# Patient Record
Sex: MALE | Employment: OTHER | URBAN - METROPOLITAN AREA
[De-identification: names, ages, dates, MRNs, and addresses within clinical notes are randomized per-mention and may not be internally consistent; named-entity substitution may affect disease eponyms.]

---

## 2024-02-03 ENCOUNTER — HOSPITAL ENCOUNTER (EMERGENCY)
Facility: HOSPITAL | Age: 69
Discharge: HOME | End: 2024-02-03
Attending: EMERGENCY MEDICINE
Payer: MEDICARE

## 2024-02-03 ENCOUNTER — HOSPITAL ENCOUNTER (EMERGENCY)
Facility: HOSPITAL | Age: 69
Discharge: STILL ADMITTED OR ADMINISTRATIVE | End: 2024-02-03
Attending: EMERGENCY MEDICINE
Payer: MEDICARE

## 2024-02-03 ENCOUNTER — APPOINTMENT (OUTPATIENT)
Dept: RADIOLOGY | Facility: HOSPITAL | Age: 69
End: 2024-02-03
Payer: MEDICARE

## 2024-02-03 VITALS
SYSTOLIC BLOOD PRESSURE: 92 MMHG | TEMPERATURE: 97.9 F | DIASTOLIC BLOOD PRESSURE: 65 MMHG | HEART RATE: 69 BPM | RESPIRATION RATE: 18 BRPM | OXYGEN SATURATION: 97 %

## 2024-02-03 DIAGNOSIS — T83.010A SUPRAPUBIC CATHETER DYSFUNCTION, INITIAL ENCOUNTER (CMS-HCC): ICD-10-CM

## 2024-02-03 DIAGNOSIS — E86.0 DEHYDRATION: Primary | ICD-10-CM

## 2024-02-03 DIAGNOSIS — M54.2 NECK PAIN: ICD-10-CM

## 2024-02-03 LAB
ALBUMIN SERPL BCP-MCNC: 3.1 G/DL (ref 3.4–5)
ALP SERPL-CCNC: 99 U/L (ref 33–136)
ALT SERPL W P-5'-P-CCNC: 25 U/L (ref 10–52)
ANION GAP SERPL CALC-SCNC: 15 MMOL/L (ref 10–20)
AST SERPL W P-5'-P-CCNC: 18 U/L (ref 9–39)
BASOPHILS # BLD AUTO: 0.02 X10*3/UL (ref 0–0.1)
BASOPHILS NFR BLD AUTO: 0.2 %
BILIRUB SERPL-MCNC: 0.5 MG/DL (ref 0–1.2)
BUN SERPL-MCNC: 26 MG/DL (ref 6–23)
CALCIUM SERPL-MCNC: 9.3 MG/DL (ref 8.6–10.3)
CHLORIDE SERPL-SCNC: 94 MMOL/L (ref 98–107)
CO2 SERPL-SCNC: 24 MMOL/L (ref 21–32)
CREAT SERPL-MCNC: 0.37 MG/DL (ref 0.5–1.3)
EGFRCR SERPLBLD CKD-EPI 2021: >90 ML/MIN/1.73M*2
EOSINOPHIL # BLD AUTO: 0.02 X10*3/UL (ref 0–0.7)
EOSINOPHIL NFR BLD AUTO: 0.2 %
ERYTHROCYTE [DISTWIDTH] IN BLOOD BY AUTOMATED COUNT: 17.2 % (ref 11.5–14.5)
GLUCOSE SERPL-MCNC: 119 MG/DL (ref 74–99)
HCT VFR BLD AUTO: 40.1 % (ref 41–52)
HGB BLD-MCNC: 12.5 G/DL (ref 13.5–17.5)
IMM GRANULOCYTES # BLD AUTO: 0.03 X10*3/UL (ref 0–0.7)
IMM GRANULOCYTES NFR BLD AUTO: 0.3 % (ref 0–0.9)
LIPASE SERPL-CCNC: 7 U/L (ref 9–82)
LYMPHOCYTES # BLD AUTO: 0.35 X10*3/UL (ref 1.2–4.8)
LYMPHOCYTES NFR BLD AUTO: 3.4 %
MCH RBC QN AUTO: 26.4 PG (ref 26–34)
MCHC RBC AUTO-ENTMCNC: 31.2 G/DL (ref 32–36)
MCV RBC AUTO: 85 FL (ref 80–100)
MONOCYTES # BLD AUTO: 0.65 X10*3/UL (ref 0.1–1)
MONOCYTES NFR BLD AUTO: 6.2 %
NEUTROPHILS # BLD AUTO: 9.36 X10*3/UL (ref 1.2–7.7)
NEUTROPHILS NFR BLD AUTO: 89.7 %
NRBC BLD-RTO: 0 /100 WBCS (ref 0–0)
PLATELET # BLD AUTO: 209 X10*3/UL (ref 150–450)
POTASSIUM SERPL-SCNC: 3.8 MMOL/L (ref 3.5–5.3)
PROT SERPL-MCNC: 7.5 G/DL (ref 6.4–8.2)
RBC # BLD AUTO: 4.73 X10*6/UL (ref 4.5–5.9)
SODIUM SERPL-SCNC: 129 MMOL/L (ref 136–145)
WBC # BLD AUTO: 10.4 X10*3/UL (ref 4.4–11.3)

## 2024-02-03 PROCEDURE — 83690 ASSAY OF LIPASE: CPT | Performed by: EMERGENCY MEDICINE

## 2024-02-03 PROCEDURE — 2500000004 HC RX 250 GENERAL PHARMACY W/ HCPCS (ALT 636 FOR OP/ED): Performed by: EMERGENCY MEDICINE

## 2024-02-03 PROCEDURE — 99283 EMERGENCY DEPT VISIT LOW MDM: CPT | Performed by: EMERGENCY MEDICINE

## 2024-02-03 PROCEDURE — 36415 COLL VENOUS BLD VENIPUNCTURE: CPT | Performed by: EMERGENCY MEDICINE

## 2024-02-03 PROCEDURE — 87086 URINE CULTURE/COLONY COUNT: CPT | Mod: AHULAB | Performed by: EMERGENCY MEDICINE

## 2024-02-03 PROCEDURE — 4500999001 HC ED NO CHARGE

## 2024-02-03 PROCEDURE — 72125 CT NECK SPINE W/O DYE: CPT

## 2024-02-03 PROCEDURE — 84075 ASSAY ALKALINE PHOSPHATASE: CPT | Performed by: EMERGENCY MEDICINE

## 2024-02-03 PROCEDURE — 51798 US URINE CAPACITY MEASURE: CPT

## 2024-02-03 PROCEDURE — 51702 INSERT TEMP BLADDER CATH: CPT | Performed by: EMERGENCY MEDICINE

## 2024-02-03 PROCEDURE — 85025 COMPLETE CBC W/AUTO DIFF WBC: CPT | Performed by: EMERGENCY MEDICINE

## 2024-02-03 PROCEDURE — 72125 CT NECK SPINE W/O DYE: CPT | Performed by: RADIOLOGY

## 2024-02-03 PROCEDURE — 99284 EMERGENCY DEPT VISIT MOD MDM: CPT | Mod: 25 | Performed by: EMERGENCY MEDICINE

## 2024-02-03 RX ORDER — CYCLOBENZAPRINE HCL 10 MG
10 TABLET ORAL 3 TIMES DAILY PRN
Qty: 21 TABLET | Refills: 0 | Status: SHIPPED | OUTPATIENT
Start: 2024-02-03 | End: 2024-05-11 | Stop reason: HOSPADM

## 2024-02-03 RX ADMIN — SODIUM CHLORIDE 1000 ML: 9 INJECTION, SOLUTION INTRAVENOUS at 02:55

## 2024-02-03 NOTE — ED PROVIDER NOTES
HPI   No chief complaint on file.        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  He has complaints today of suprapubic catheter dysfunction.  Suprapubic catheter was placed on 9 January.  He apparently had stopped flowing today and he felt full.  The wife attempted to flush it and stated that there was a balloon that blew up.  Patient also notes 3 days of increased neck pain.  Denies any new falls.  No new fever.  He does have some sensory dysfunction below his chest.  Does have a trach.  He answers yes/no questions.  Can mild out certain words but difficult understand otherwise.  History is also obtained from patient's wife.                  [place] Coma Scale Score: 15                  Patient History   No past medical history on file.  No past surgical history on file.  No family history on file.  Social History     Tobacco Use    Smoking status: Not on file    Smokeless tobacco: Not on file   Substance Use Topics    Alcohol use: Not on file    Drug use: Not on file       Physical Exam   ED Triage Vitals   Temp Pulse Resp BP   -- -- -- --      SpO2 Temp src Heart Rate Source Patient Position   -- -- -- --      BP Location FiO2 (%)     -- --       Physical Exam  Vitals and nursing note reviewed.   Constitutional:       General: He is not in acute distress.     Appearance: He is well-developed.   HENT:      Head: Normocephalic and atraumatic.   Eyes:      Conjunctiva/sclera: Conjunctivae normal.   Cardiovascular:      Rate and Rhythm: Normal rate and regular rhythm.      Heart sounds: No murmur heard.  Pulmonary:      Effort: Pulmonary effort is normal. No respiratory distress.      Breath sounds: Normal breath sounds.   Abdominal:      Palpations: Abdomen is soft.      Tenderness: There is no abdominal tenderness.      Comments: Suprapubic catheter site clean dry intact   Musculoskeletal:         General: Deformity present. No swelling.      Cervical back:  Neck supple.      Right lower leg: Edema present.      Left lower leg: Edema present.      Comments: Chronic contractures of lower extremities, trace edema symmetric bilaterally   Skin:     General: Skin is warm and dry.      Capillary Refill: Capillary refill takes less than 2 seconds.   Neurological:      Mental Status: He is alert.   Psychiatric:         Mood and Affect: Mood normal.         ED Course & MDM   Diagnoses as of 02/03/24 0327   Dehydration   Suprapubic catheter dysfunction, initial encounter (CMS/[facility])   Neck pain       Medical Decision Making  I replaced a coudé catheter in the suprapubic catheter.  And was concerned when there was no return of urine.  We BladderScan and seems to be there is less than 40 cc of urine.  Patient does have dark urine in his bag recently may have been dehydrated and not be producing urine.  Will assess kidney function today.  Will also assess a CT of the neck.    Procedure  Bladder Catheterization    Performed by: Hossein Godfrey MD  Authorized by: Hossein Godfrey MD    Consent:     Consent obtained:  Verbal    Consent given by:  Patient and spouse  Universal protocol:     Procedure explained and questions answered to patient or proxy's satisfaction: yes      Patient identity confirmed:  Verbally with patient  Pre-procedure details:     Procedure purpose:  Therapeutic  Anesthesia:     Anesthesia method:  None  Procedure details:     Provider performed due to:  Altered anatomy       Hossein Godfrey MD  02/03/24 0019

## 2024-02-04 LAB — BACTERIA UR CULT: ABNORMAL

## 2024-02-05 ENCOUNTER — TELEPHONE (OUTPATIENT)
Dept: PHARMACY | Facility: HOSPITAL | Age: 69
End: 2024-02-05
Payer: MEDICARE

## 2024-02-05 NOTE — PROGRESS NOTES
EDPD Note: Rapid Result Review    Reviewed Mr. Wai Rodrigues III 's chart regarding a inconclusive urine culture that was taken during their recent emergency room visit. The patient was not told about these results prior to leaving the emergency department. Therefore, patient was contacted and given proper education.     Patient presented to the ED with complaints of catheter dysfunction. Patient was discharged without any antibiotics. Spoke with patient's wife Lotus today who denied that the patient was having any urinary symptoms. Therefore, given the inconclusive result and no urinary symptoms no antibiotics are appropriate at this time.     Urine Culture Multiple organisms present, probable contamination. Repeat culture if clinically indicated. Abnormal           No further follow up needed from EDPD Team.     Halima Mendes, PharmD

## 2024-03-21 ENCOUNTER — HOSPITAL ENCOUNTER (OUTPATIENT)
Dept: RADIOLOGY | Facility: CLINIC | Age: 69
Discharge: HOME | End: 2024-03-21
Payer: MEDICARE

## 2024-03-21 ENCOUNTER — OFFICE VISIT (OUTPATIENT)
Dept: OTOLARYNGOLOGY | Facility: CLINIC | Age: 69
End: 2024-03-21
Payer: MEDICARE

## 2024-03-21 DIAGNOSIS — J39.8 TRACHEAL STENOSIS: ICD-10-CM

## 2024-03-21 DIAGNOSIS — Z01.818 PRE-OPERATIVE CLEARANCE: ICD-10-CM

## 2024-03-21 DIAGNOSIS — Z01.818 PRE-OPERATIVE CLEARANCE: Primary | ICD-10-CM

## 2024-03-21 DIAGNOSIS — Z43.0 ATTENTION TO TRACHEOSTOMY (MULTI): ICD-10-CM

## 2024-03-21 PROCEDURE — 31615 TRCHEOBRNCHSC EST TRACHS INC: CPT | Performed by: OTOLARYNGOLOGY

## 2024-03-21 PROCEDURE — 31579 LARYNGOSCOPY TELESCOPIC: CPT | Performed by: OTOLARYNGOLOGY

## 2024-03-21 PROCEDURE — 99214 OFFICE O/P EST MOD 30 MIN: CPT | Performed by: OTOLARYNGOLOGY

## 2024-03-21 PROCEDURE — 1159F MED LIST DOCD IN RCRD: CPT | Performed by: OTOLARYNGOLOGY

## 2024-03-21 PROCEDURE — 71045 X-RAY EXAM CHEST 1 VIEW: CPT

## 2024-03-21 RX ORDER — ATORVASTATIN CALCIUM 80 MG/1
80 TABLET, FILM COATED ORAL
COMMUNITY
Start: 2023-06-07 | End: 2025-02-26

## 2024-03-21 RX ORDER — CLONAZEPAM 1 MG/1
1 TABLET ORAL 2 TIMES DAILY
COMMUNITY
Start: 2023-06-07

## 2024-03-21 RX ORDER — NAPROXEN SODIUM 220 MG/1
81 TABLET, FILM COATED ORAL
COMMUNITY
Start: 2023-06-07

## 2024-03-21 RX ORDER — ACETAMINOPHEN 160 MG/5ML
650 SOLUTION ORAL EVERY 6 HOURS PRN
COMMUNITY
Start: 2023-04-21

## 2024-03-21 RX ORDER — ACETYLCYSTEINE 100 MG/ML
6 SOLUTION ORAL; RESPIRATORY (INHALATION) 3 TIMES DAILY
COMMUNITY
Start: 2023-12-07 | End: 2024-12-06

## 2024-03-21 RX ORDER — GLYCERIN/MALTODEXTRIN
30 LIQUID (ML) ORAL 3 TIMES DAILY
COMMUNITY
Start: 2023-10-19

## 2024-03-21 RX ORDER — PEDI MULTIVIT 158/IRON/VIT K1 18MG-10MCG
1 TABLET,CHEWABLE ORAL
COMMUNITY

## 2024-03-21 RX ORDER — ACETAMINOPHEN 325 MG/1
2 TABLET ORAL EVERY 4 HOURS PRN
COMMUNITY
Start: 2023-07-31

## 2024-03-21 ASSESSMENT — PATIENT HEALTH QUESTIONNAIRE - PHQ9
1. LITTLE INTEREST OR PLEASURE IN DOING THINGS: NOT AT ALL
2. FEELING DOWN, DEPRESSED OR HOPELESS: NOT AT ALL
SUM OF ALL RESPONSES TO PHQ9 QUESTIONS 1 AND 2: 0

## 2024-03-21 NOTE — PROGRESS NOTES
ASSESSMENT AND PLAN:   Wai Rodrigues III is a 68 y.o. male with a history of quadriplegia after a fall. He was intubated for 5 weeks before trach. He was capped for a short period of time, but did not tolerate this.      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 No stenosis and I anticipate a stomoplasty and balloon dilation/CO2 will improve airway diameter. He will be scheduled for this at a time that is convenient for him. He would like this done at [facility] as they live in [place], [place].       Reason For Consult  No chief complaint on file.       HISTORY OF PRESENT ILLNESS:  Wai Rodrigues III is a 68 y.o. male presenting for a visit with me for possible SGS.  He is a quadriplegic after a fall, suprapubic catheter. Goes to [facility]. Dissatisfied with breathing treatments and was referred for possible decannulation.     He is accompanied by his wife who also serves as his historian. The patient reports that the trach has been present since 04/2023. He had a fall while getting back in bed and was hospitalized. He was intubated for 5 weeks and subsequently trached. He is tolerating his PMV. He was seen by Dr. Batista who noted scar tissue that would prevent decanulation. His voice and breathing are improving after his most recent trach change from a cuffed trach.     Prior History:   Previously seen by Dr. Batista     NPV - no ENT notes    Past Medical History  He has no past medical history on file. Surgical History  He has no past surgical history on file.   Social History  He has no history on file for tobacco use, alcohol use, and drug use. Allergies  Patient has no allergy information on record.     Family History  No family history on file.    Review of Systems  All 10 systems were reviewed and negative except for above.      Last Recorded  Vitals  There were no vitals taken for this visit.    Physical Exam  ENT Physical Exam  Constitutional  Appearance: patient appears well-developed and well-nourished,  Head and Face  Appearance: head appears normal and face appears normal;  Ear  Auricles: right auricle normal; left auricle normal;  Nose  External Nose: nares patent bilaterally;  Oral Cavity/Oropharynx  Lips: normal;  Neck  Neck: neck palpation normal; tracheostomy noted;  Respiratory  Inspection: no retractions visible;  Cardiovascular  Inspection: no peripheral edema present;  Neurovestibular  Mental Status: alert and oriented;  Psychiatric: mood normal;  Cranial Nerves: cranial nerves intact;          Procedures     Flexible Laryngoscopy w/ Videostroboscopy    VOICE AND SPEECH CHARACTERISTICS:  Normal spoken speech, (+) moderate dysphonia, no roughness, (+) moderate breathiness, (+) moderate asthenia, (+) mild strain.    Intelligibility: reduced.   Resonance: balanced.   Vocal Loudness: reduced.   Breath Support: decreased.    PROCEDURE:    Indications:  Trach care  PROCEDURE NOTE: BRONCHOSCOPY VIA TRACHEOSTOMY   I recommended bronchoscopy via tracheostomy based on PE findings, and/or concern for pathology based upon history of airway complaints. Risks, benefits, and alternatives were explained. The patient wishes to proceed and gives verbal consent.  Patient is seated in the exam chair. After adequate topical anesthesia, I advance the flexible endoscope. The examination included evaluation of the calderon, vallecula, base of tongue, pyriforms, post-cricoid area, larynx and immediate subglottis.  Findings : assessment of the nasopharynx, base of tongue/vallecula, pyriform sinuses, post-cricoid area and pharyngeal walls was without lesion or mass, pharyngeal wall contraction is normal and symmetric, and no pooling of secretions  ventricular obliteration: 2 (present)  Gross Arytenoid Movement: symmetric.  Arytenoid Height: normal.   Supraglottic  Tension: lateral and ant/post.  Symmetry: normal.   Amplitude: normal.  Phase Closure: in-phase.  Mucosal Wave Lateral Excursion/Secondary Wave: Bilateral Vocal Cord: no restriction - wave moved more than ½ the width of the vocal fold.  Periodicity: normal.  Closure: bowing.  Additional Findings: Widely patent to the antwon. Suprastomal tissue collapse posteriorly. I was unable to evaluate the suprastomal trachea.       Time Spent  Prep time on day of patient encounter: 10 minutes  Time spent directly with patient, family or caregiver: 15 minutes  Additional Time Spent on Patient Care Activities/Discussion with SLP re care plan: 5 minutes  Documentation Time: 10 minutes  Other Time Spent: 0 minutes  Total: 40 minutes       Scribe Attestation  By signing my name below, I, Edna Keith , Scribedilia attest that this documentation has been prepared under the direction and in the presence of Rikki Hardin MD.

## 2024-05-09 ENCOUNTER — ANESTHESIA EVENT (OUTPATIENT)
Dept: OPERATING ROOM | Facility: HOSPITAL | Age: 69
DRG: 166 | End: 2024-05-09
Payer: MEDICARE

## 2024-05-10 ENCOUNTER — APPOINTMENT (OUTPATIENT)
Dept: RADIOLOGY | Facility: HOSPITAL | Age: 69
DRG: 166 | End: 2024-05-10
Payer: MEDICARE

## 2024-05-10 ENCOUNTER — HOSPITAL ENCOUNTER (INPATIENT)
Facility: HOSPITAL | Age: 69
LOS: 1 days | Discharge: HOME | DRG: 166 | End: 2024-05-11
Attending: OTOLARYNGOLOGY | Admitting: OTOLARYNGOLOGY
Payer: MEDICARE

## 2024-05-10 ENCOUNTER — ANESTHESIA (OUTPATIENT)
Dept: OPERATING ROOM | Facility: HOSPITAL | Age: 69
DRG: 166 | End: 2024-05-10
Payer: MEDICARE

## 2024-05-10 DIAGNOSIS — J39.8 TRACHEAL STENOSIS: Primary | ICD-10-CM

## 2024-05-10 DIAGNOSIS — Z43.0 ATTENTION TO TRACHEOSTOMY (MULTI): ICD-10-CM

## 2024-05-10 PROBLEM — I25.10 CAD (CORONARY ARTERY DISEASE): Status: ACTIVE | Noted: 2024-05-10

## 2024-05-10 PROBLEM — I10 HTN (HYPERTENSION): Status: ACTIVE | Noted: 2024-05-10

## 2024-05-10 PROCEDURE — 7100000001 HC RECOVERY ROOM TIME - INITIAL BASE CHARGE: Performed by: OTOLARYNGOLOGY

## 2024-05-10 PROCEDURE — 2500000001 HC RX 250 WO HCPCS SELF ADMINISTERED DRUGS (ALT 637 FOR MEDICARE OP): Performed by: PHARMACIST

## 2024-05-10 PROCEDURE — 1100000001 HC PRIVATE ROOM DAILY

## 2024-05-10 PROCEDURE — A4217 STERILE WATER/SALINE, 500 ML: HCPCS | Performed by: OTOLARYNGOLOGY

## 2024-05-10 PROCEDURE — 71045 X-RAY EXAM CHEST 1 VIEW: CPT | Performed by: RADIOLOGY

## 2024-05-10 PROCEDURE — 2500000004 HC RX 250 GENERAL PHARMACY W/ HCPCS (ALT 636 FOR OP/ED): Performed by: ANESTHESIOLOGIST ASSISTANT

## 2024-05-10 PROCEDURE — 2720000007 HC OR 272 NO HCPCS: Performed by: OTOLARYNGOLOGY

## 2024-05-10 PROCEDURE — 94640 AIRWAY INHALATION TREATMENT: CPT

## 2024-05-10 PROCEDURE — 0B718ZZ DILATION OF TRACHEA, VIA NATURAL OR ARTIFICIAL OPENING ENDOSCOPIC: ICD-10-PCS | Performed by: OTOLARYNGOLOGY

## 2024-05-10 PROCEDURE — 0CJS8ZZ INSPECTION OF LARYNX, VIA NATURAL OR ARTIFICIAL OPENING ENDOSCOPIC: ICD-10-PCS | Performed by: OTOLARYNGOLOGY

## 2024-05-10 PROCEDURE — 2500000005 HC RX 250 GENERAL PHARMACY W/O HCPCS: Performed by: ANESTHESIOLOGIST ASSISTANT

## 2024-05-10 PROCEDURE — 31630 BRONCHOSCOPY DILATE/FX REPR: CPT | Performed by: OTOLARYNGOLOGY

## 2024-05-10 PROCEDURE — 2500000001 HC RX 250 WO HCPCS SELF ADMINISTERED DRUGS (ALT 637 FOR MEDICARE OP): Performed by: OTOLARYNGOLOGY

## 2024-05-10 PROCEDURE — 3600000007 HC OR TIME - EACH INCREMENTAL 1 MINUTE - PROCEDURE LEVEL TWO: Performed by: OTOLARYNGOLOGY

## 2024-05-10 PROCEDURE — 7100000002 HC RECOVERY ROOM TIME - EACH INCREMENTAL 1 MINUTE: Performed by: OTOLARYNGOLOGY

## 2024-05-10 PROCEDURE — C1726 CATH, BAL DIL, NON-VASCULAR: HCPCS | Performed by: OTOLARYNGOLOGY

## 2024-05-10 PROCEDURE — 3700000001 HC GENERAL ANESTHESIA TIME - INITIAL BASE CHARGE: Performed by: OTOLARYNGOLOGY

## 2024-05-10 PROCEDURE — 99238 HOSP IP/OBS DSCHRG MGMT 30/<: CPT | Performed by: OTOLARYNGOLOGY

## 2024-05-10 PROCEDURE — 2500000005 HC RX 250 GENERAL PHARMACY W/O HCPCS: Performed by: OTOLARYNGOLOGY

## 2024-05-10 PROCEDURE — 2500000004 HC RX 250 GENERAL PHARMACY W/ HCPCS (ALT 636 FOR OP/ED): Performed by: STUDENT IN AN ORGANIZED HEALTH CARE EDUCATION/TRAINING PROGRAM

## 2024-05-10 PROCEDURE — 96372 THER/PROPH/DIAG INJ SC/IM: CPT | Performed by: OTOLARYNGOLOGY

## 2024-05-10 PROCEDURE — 2500000005 HC RX 250 GENERAL PHARMACY W/O HCPCS: Performed by: ANESTHESIOLOGY

## 2024-05-10 PROCEDURE — A31526 PR LARYNGOSCOPY,DIRECT,DX,OP MICROSCOP: Performed by: STUDENT IN AN ORGANIZED HEALTH CARE EDUCATION/TRAINING PROGRAM

## 2024-05-10 PROCEDURE — 2500000004 HC RX 250 GENERAL PHARMACY W/ HCPCS (ALT 636 FOR OP/ED): Performed by: ANESTHESIOLOGY

## 2024-05-10 PROCEDURE — A31526 PR LARYNGOSCOPY,DIRECT,DX,OP MICROSCOP: Performed by: ANESTHESIOLOGIST ASSISTANT

## 2024-05-10 PROCEDURE — 31571 LARYNGOSCOP W/VC INJ + SCOPE: CPT | Performed by: OTOLARYNGOLOGY

## 2024-05-10 PROCEDURE — 2500000002 HC RX 250 W HCPCS SELF ADMINISTERED DRUGS (ALT 637 FOR MEDICARE OP, ALT 636 FOR OP/ED): Performed by: ANESTHESIOLOGY

## 2024-05-10 PROCEDURE — 3700000002 HC GENERAL ANESTHESIA TIME - EACH INCREMENTAL 1 MINUTE: Performed by: OTOLARYNGOLOGY

## 2024-05-10 PROCEDURE — 2500000001 HC RX 250 WO HCPCS SELF ADMINISTERED DRUGS (ALT 637 FOR MEDICARE OP): Performed by: STUDENT IN AN ORGANIZED HEALTH CARE EDUCATION/TRAINING PROGRAM

## 2024-05-10 PROCEDURE — 31613 TRACHEOSTOMA REVJ SIMPLE: CPT | Performed by: OTOLARYNGOLOGY

## 2024-05-10 PROCEDURE — 2500000004 HC RX 250 GENERAL PHARMACY W/ HCPCS (ALT 636 FOR OP/ED): Performed by: OTOLARYNGOLOGY

## 2024-05-10 PROCEDURE — 0B21XFZ CHANGE TRACHEOSTOMY DEVICE IN TRACHEA, EXTERNAL APPROACH: ICD-10-PCS | Performed by: OTOLARYNGOLOGY

## 2024-05-10 PROCEDURE — 71045 X-RAY EXAM CHEST 1 VIEW: CPT

## 2024-05-10 PROCEDURE — 3600000002 HC OR TIME - INITIAL BASE CHARGE - PROCEDURE LEVEL TWO: Performed by: OTOLARYNGOLOGY

## 2024-05-10 PROCEDURE — 2500000002 HC RX 250 W HCPCS SELF ADMINISTERED DRUGS (ALT 637 FOR MEDICARE OP, ALT 636 FOR OP/ED): Performed by: STUDENT IN AN ORGANIZED HEALTH CARE EDUCATION/TRAINING PROGRAM

## 2024-05-10 RX ORDER — OXYCODONE HYDROCHLORIDE 5 MG/1
5 TABLET ORAL EVERY 4 HOURS PRN
Status: DISCONTINUED | OUTPATIENT
Start: 2024-05-10 | End: 2024-05-10

## 2024-05-10 RX ORDER — ACETAMINOPHEN 325 MG/1
975 TABLET ORAL ONCE
Status: COMPLETED | OUTPATIENT
Start: 2024-05-10 | End: 2024-05-10

## 2024-05-10 RX ORDER — LANOLIN ALCOHOL/MO/W.PET/CERES
500 CREAM (GRAM) TOPICAL DAILY
COMMUNITY

## 2024-05-10 RX ORDER — DEXAMETHASONE SODIUM PHOSPHATE 10 MG/ML
INJECTION INTRAMUSCULAR; INTRAVENOUS AS NEEDED
Status: DISCONTINUED | OUTPATIENT
Start: 2024-05-10 | End: 2024-05-10

## 2024-05-10 RX ORDER — ROCURONIUM BROMIDE 10 MG/ML
INJECTION, SOLUTION INTRAVENOUS AS NEEDED
Status: DISCONTINUED | OUTPATIENT
Start: 2024-05-10 | End: 2024-05-10

## 2024-05-10 RX ORDER — ACETYLCYSTEINE 200 MG/ML
3 SOLUTION ORAL; RESPIRATORY (INHALATION) ONCE
Status: COMPLETED | OUTPATIENT
Start: 2024-05-10 | End: 2024-05-10

## 2024-05-10 RX ORDER — FLUOXETINE HYDROCHLORIDE 20 MG/1
20 CAPSULE ORAL DAILY
COMMUNITY

## 2024-05-10 RX ORDER — LIDOCAINE HYDROCHLORIDE 20 MG/ML
INJECTION, SOLUTION EPIDURAL; INFILTRATION; INTRACAUDAL; PERINEURAL AS NEEDED
Status: DISCONTINUED | OUTPATIENT
Start: 2024-05-10 | End: 2024-05-10

## 2024-05-10 RX ORDER — DIPHENHYDRAMINE HYDROCHLORIDE 50 MG/ML
12.5 INJECTION INTRAMUSCULAR; INTRAVENOUS ONCE AS NEEDED
Status: DISCONTINUED | OUTPATIENT
Start: 2024-05-10 | End: 2024-05-10

## 2024-05-10 RX ORDER — IPRATROPIUM BROMIDE AND ALBUTEROL SULFATE 2.5; .5 MG/3ML; MG/3ML
3 SOLUTION RESPIRATORY (INHALATION) EVERY 4 HOURS PRN
Status: DISCONTINUED | OUTPATIENT
Start: 2024-05-10 | End: 2024-05-10

## 2024-05-10 RX ORDER — SODIUM CHLORIDE 0.9 G/100ML
IRRIGANT IRRIGATION AS NEEDED
Status: DISCONTINUED | OUTPATIENT
Start: 2024-05-10 | End: 2024-05-10 | Stop reason: HOSPADM

## 2024-05-10 RX ORDER — NAPROXEN SODIUM 220 MG/1
81 TABLET, FILM COATED ORAL
Status: DISCONTINUED | OUTPATIENT
Start: 2024-05-11 | End: 2024-05-10

## 2024-05-10 RX ORDER — HYDRALAZINE HYDROCHLORIDE 20 MG/ML
5 INJECTION INTRAMUSCULAR; INTRAVENOUS EVERY 30 MIN PRN
Status: DISCONTINUED | OUTPATIENT
Start: 2024-05-10 | End: 2024-05-10

## 2024-05-10 RX ORDER — ACETYLCYSTEINE 200 MG/ML
0.25 SOLUTION ORAL; RESPIRATORY (INHALATION)
Status: DISCONTINUED | OUTPATIENT
Start: 2024-05-10 | End: 2024-05-11 | Stop reason: HOSPADM

## 2024-05-10 RX ORDER — EPINEPHRINE 1 MG/ML
INJECTION INTRAMUSCULAR; INTRAVENOUS; SUBCUTANEOUS AS NEEDED
Status: DISCONTINUED | OUTPATIENT
Start: 2024-05-10 | End: 2024-05-10 | Stop reason: HOSPADM

## 2024-05-10 RX ORDER — IPRATROPIUM BROMIDE AND ALBUTEROL SULFATE 2.5; .5 MG/3ML; MG/3ML
3 SOLUTION RESPIRATORY (INHALATION)
Status: DISCONTINUED | OUTPATIENT
Start: 2024-05-11 | End: 2024-05-11 | Stop reason: HOSPADM

## 2024-05-10 RX ORDER — CLONAZEPAM 1 MG/1
1 TABLET ORAL 2 TIMES DAILY
Status: DISCONTINUED | OUTPATIENT
Start: 2024-05-10 | End: 2024-05-10

## 2024-05-10 RX ORDER — TRAZODONE HYDROCHLORIDE 50 MG/1
50 TABLET ORAL NIGHTLY
COMMUNITY

## 2024-05-10 RX ORDER — NAPROXEN SODIUM 220 MG/1
81 TABLET, FILM COATED ORAL DAILY
Status: DISCONTINUED | OUTPATIENT
Start: 2024-05-10 | End: 2024-05-11 | Stop reason: HOSPADM

## 2024-05-10 RX ORDER — SODIUM CHLORIDE, SODIUM LACTATE, POTASSIUM CHLORIDE, CALCIUM CHLORIDE 600; 310; 30; 20 MG/100ML; MG/100ML; MG/100ML; MG/100ML
100 INJECTION, SOLUTION INTRAVENOUS CONTINUOUS
Status: DISCONTINUED | OUTPATIENT
Start: 2024-05-10 | End: 2024-05-10 | Stop reason: HOSPADM

## 2024-05-10 RX ORDER — MELATONIN 3 MG
6 CAPSULE ORAL NIGHTLY
COMMUNITY

## 2024-05-10 RX ORDER — MIRTAZAPINE 30 MG/1
30 TABLET, ORALLY DISINTEGRATING ORAL NIGHTLY
COMMUNITY

## 2024-05-10 RX ORDER — FENTANYL CITRATE 50 UG/ML
INJECTION, SOLUTION INTRAMUSCULAR; INTRAVENOUS AS NEEDED
Status: DISCONTINUED | OUTPATIENT
Start: 2024-05-10 | End: 2024-05-10

## 2024-05-10 RX ORDER — PROPOFOL 10 MG/ML
INJECTION, EMULSION INTRAVENOUS AS NEEDED
Status: DISCONTINUED | OUTPATIENT
Start: 2024-05-10 | End: 2024-05-10

## 2024-05-10 RX ORDER — CLONAZEPAM 1 MG/1
1 TABLET ORAL NIGHTLY
Status: DISCONTINUED | OUTPATIENT
Start: 2024-05-10 | End: 2024-05-11 | Stop reason: HOSPADM

## 2024-05-10 RX ORDER — HEPARIN SODIUM 5000 [USP'U]/ML
5000 INJECTION, SOLUTION INTRAVENOUS; SUBCUTANEOUS EVERY 8 HOURS
Status: DISCONTINUED | OUTPATIENT
Start: 2024-05-10 | End: 2024-05-11 | Stop reason: HOSPADM

## 2024-05-10 RX ORDER — ONDANSETRON 4 MG/1
4 TABLET, ORALLY DISINTEGRATING ORAL EVERY 8 HOURS PRN
Status: DISCONTINUED | OUTPATIENT
Start: 2024-05-10 | End: 2024-05-11 | Stop reason: HOSPADM

## 2024-05-10 RX ORDER — POLYETHYLENE GLYCOL 3350 17 G/17G
17 POWDER, FOR SOLUTION ORAL DAILY
Status: DISCONTINUED | OUTPATIENT
Start: 2024-05-10 | End: 2024-05-11 | Stop reason: HOSPADM

## 2024-05-10 RX ORDER — ACETAMINOPHEN 160 MG/5ML
650 SOLUTION ORAL EVERY 6 HOURS PRN
Status: DISCONTINUED | OUTPATIENT
Start: 2024-05-10 | End: 2024-05-11 | Stop reason: HOSPADM

## 2024-05-10 RX ORDER — MIDAZOLAM HYDROCHLORIDE 1 MG/ML
INJECTION INTRAMUSCULAR; INTRAVENOUS AS NEEDED
Status: DISCONTINUED | OUTPATIENT
Start: 2024-05-10 | End: 2024-05-10

## 2024-05-10 RX ORDER — SODIUM CHLORIDE, SODIUM LACTATE, POTASSIUM CHLORIDE, CALCIUM CHLORIDE 600; 310; 30; 20 MG/100ML; MG/100ML; MG/100ML; MG/100ML
100 INJECTION, SOLUTION INTRAVENOUS CONTINUOUS
Status: DISCONTINUED | OUTPATIENT
Start: 2024-05-10 | End: 2024-05-10

## 2024-05-10 RX ORDER — ALBUTEROL SULFATE 0.83 MG/ML
2.5 SOLUTION RESPIRATORY (INHALATION) ONCE AS NEEDED
Status: COMPLETED | OUTPATIENT
Start: 2024-05-10 | End: 2024-05-10

## 2024-05-10 RX ORDER — FLUOXETINE HYDROCHLORIDE 20 MG/1
20 CAPSULE ORAL DAILY
Status: DISCONTINUED | OUTPATIENT
Start: 2024-05-10 | End: 2024-05-11 | Stop reason: HOSPADM

## 2024-05-10 RX ORDER — ONDANSETRON HYDROCHLORIDE 2 MG/ML
4 INJECTION, SOLUTION INTRAVENOUS ONCE AS NEEDED
Status: DISCONTINUED | OUTPATIENT
Start: 2024-05-10 | End: 2024-05-10

## 2024-05-10 RX ORDER — ATORVASTATIN CALCIUM 80 MG/1
80 TABLET, FILM COATED ORAL DAILY
Status: DISCONTINUED | OUTPATIENT
Start: 2024-05-11 | End: 2024-05-11 | Stop reason: HOSPADM

## 2024-05-10 RX ORDER — TRAZODONE HYDROCHLORIDE 50 MG/1
50 TABLET ORAL NIGHTLY
Status: DISCONTINUED | OUTPATIENT
Start: 2024-05-10 | End: 2024-05-11 | Stop reason: HOSPADM

## 2024-05-10 RX ORDER — ONDANSETRON HYDROCHLORIDE 2 MG/ML
4 INJECTION, SOLUTION INTRAVENOUS EVERY 8 HOURS PRN
Status: DISCONTINUED | OUTPATIENT
Start: 2024-05-10 | End: 2024-05-11 | Stop reason: HOSPADM

## 2024-05-10 RX ORDER — NALOXONE HYDROCHLORIDE 0.4 MG/ML
0.2 INJECTION, SOLUTION INTRAMUSCULAR; INTRAVENOUS; SUBCUTANEOUS EVERY 5 MIN PRN
Status: DISCONTINUED | OUTPATIENT
Start: 2024-05-10 | End: 2024-05-11 | Stop reason: HOSPADM

## 2024-05-10 RX ORDER — ACETAMINOPHEN 325 MG/1
650 TABLET ORAL EVERY 6 HOURS PRN
Status: DISCONTINUED | OUTPATIENT
Start: 2024-05-10 | End: 2024-05-11 | Stop reason: HOSPADM

## 2024-05-10 RX ORDER — DEXAMETHASONE SODIUM PHOSPHATE 100 MG/10ML
INJECTION INTRAMUSCULAR; INTRAVENOUS AS NEEDED
Status: DISCONTINUED | OUTPATIENT
Start: 2024-05-10 | End: 2024-05-10 | Stop reason: HOSPADM

## 2024-05-10 RX ORDER — MIRTAZAPINE 15 MG/1
30 TABLET, FILM COATED ORAL NIGHTLY
Status: DISCONTINUED | OUTPATIENT
Start: 2024-05-10 | End: 2024-05-11 | Stop reason: HOSPADM

## 2024-05-10 RX ORDER — IPRATROPIUM BROMIDE AND ALBUTEROL SULFATE 2.5; .5 MG/3ML; MG/3ML
3 SOLUTION RESPIRATORY (INHALATION) EVERY 2 HOUR PRN
Status: DISCONTINUED | OUTPATIENT
Start: 2024-05-10 | End: 2024-05-11 | Stop reason: HOSPADM

## 2024-05-10 RX ORDER — TALC
6 POWDER (GRAM) TOPICAL NIGHTLY
Status: DISCONTINUED | OUTPATIENT
Start: 2024-05-10 | End: 2024-05-11 | Stop reason: HOSPADM

## 2024-05-10 RX ORDER — SODIUM CHLORIDE, SODIUM LACTATE, POTASSIUM CHLORIDE, CALCIUM CHLORIDE 600; 310; 30; 20 MG/100ML; MG/100ML; MG/100ML; MG/100ML
INJECTION, SOLUTION INTRAVENOUS CONTINUOUS PRN
Status: DISCONTINUED | OUTPATIENT
Start: 2024-05-10 | End: 2024-05-10

## 2024-05-10 RX ORDER — ONDANSETRON HYDROCHLORIDE 2 MG/ML
INJECTION, SOLUTION INTRAVENOUS AS NEEDED
Status: DISCONTINUED | OUTPATIENT
Start: 2024-05-10 | End: 2024-05-10

## 2024-05-10 RX ORDER — PHENYLEPHRINE HCL IN 0.9% NACL 1 MG/10 ML
SYRINGE (ML) INTRAVENOUS AS NEEDED
Status: DISCONTINUED | OUTPATIENT
Start: 2024-05-10 | End: 2024-05-10

## 2024-05-10 RX ADMIN — ACETYLCYSTEINE 50 MG: 200 SOLUTION ORAL; RESPIRATORY (INHALATION) at 22:36

## 2024-05-10 RX ADMIN — Medication 200 MCG: at 15:10

## 2024-05-10 RX ADMIN — Medication 6 MG: at 23:21

## 2024-05-10 RX ADMIN — ACETYLCYSTEINE 600 MG: 200 SOLUTION ORAL; RESPIRATORY (INHALATION) at 18:11

## 2024-05-10 RX ADMIN — DEXAMETHASONE SODIUM PHOSPHATE 10 MG: 10 INJECTION, SOLUTION INTRAMUSCULAR; INTRAVENOUS at 14:56

## 2024-05-10 RX ADMIN — SUGAMMADEX 200 MG: 100 INJECTION, SOLUTION INTRAVENOUS at 15:46

## 2024-05-10 RX ADMIN — CLONAZEPAM 1 MG: 1 TABLET ORAL at 23:21

## 2024-05-10 RX ADMIN — FENTANYL CITRATE 25 MCG: 50 INJECTION, SOLUTION INTRAMUSCULAR; INTRAVENOUS at 15:30

## 2024-05-10 RX ADMIN — FENTANYL CITRATE 25 MCG: 50 INJECTION, SOLUTION INTRAMUSCULAR; INTRAVENOUS at 15:28

## 2024-05-10 RX ADMIN — MIRTAZAPINE 30 MG: 15 TABLET, FILM COATED ORAL at 23:20

## 2024-05-10 RX ADMIN — Medication 300 MCG: at 15:11

## 2024-05-10 RX ADMIN — PROPOFOL 30 MG: 10 INJECTION, EMULSION INTRAVENOUS at 15:24

## 2024-05-10 RX ADMIN — SODIUM CHLORIDE, POTASSIUM CHLORIDE, SODIUM LACTATE AND CALCIUM CHLORIDE: 600; 310; 30; 20 INJECTION, SOLUTION INTRAVENOUS at 09:54

## 2024-05-10 RX ADMIN — FLUOXETINE HYDROCHLORIDE 20 MG: 20 CAPSULE ORAL at 23:21

## 2024-05-10 RX ADMIN — ASPIRIN 81 MG 81 MG: 81 TABLET ORAL at 23:20

## 2024-05-10 RX ADMIN — PROPOFOL 170 MG: 10 INJECTION, EMULSION INTRAVENOUS at 14:56

## 2024-05-10 RX ADMIN — Medication 50 PERCENT: at 22:36

## 2024-05-10 RX ADMIN — MIDAZOLAM HYDROCHLORIDE 2 MG: 1 INJECTION, SOLUTION INTRAMUSCULAR; INTRAVENOUS at 14:50

## 2024-05-10 RX ADMIN — ACETAMINOPHEN 975 MG: 325 TABLET ORAL at 11:14

## 2024-05-10 RX ADMIN — ROCURONIUM 50 MG: 100 INJECTION, SOLUTION INTRAVENOUS at 14:56

## 2024-05-10 RX ADMIN — TRAZODONE HYDROCHLORIDE 50 MG: 50 TABLET ORAL at 23:21

## 2024-05-10 RX ADMIN — HEPARIN SODIUM 5000 UNITS: 5000 INJECTION INTRAVENOUS; SUBCUTANEOUS at 23:21

## 2024-05-10 RX ADMIN — SODIUM CHLORIDE, POTASSIUM CHLORIDE, SODIUM LACTATE AND CALCIUM CHLORIDE 100 ML/HR: 600; 310; 30; 20 INJECTION, SOLUTION INTRAVENOUS at 11:09

## 2024-05-10 RX ADMIN — ALBUTEROL SULFATE 2.5 MG: 2.5 SOLUTION RESPIRATORY (INHALATION) at 16:35

## 2024-05-10 RX ADMIN — CARBOXYMETHYLCELLULOSE SODIUM 2 DROP: 5 SOLUTION/ DROPS OPHTHALMIC at 14:56

## 2024-05-10 RX ADMIN — FENTANYL CITRATE 25 MCG: 50 INJECTION, SOLUTION INTRAMUSCULAR; INTRAVENOUS at 15:24

## 2024-05-10 RX ADMIN — Medication 500 MCG: at 15:14

## 2024-05-10 RX ADMIN — IPRATROPIUM BROMIDE AND ALBUTEROL SULFATE 3 ML: 2.5; .5 SOLUTION RESPIRATORY (INHALATION) at 22:38

## 2024-05-10 RX ADMIN — FENTANYL CITRATE 25 MCG: 50 INJECTION, SOLUTION INTRAMUSCULAR; INTRAVENOUS at 15:26

## 2024-05-10 RX ADMIN — ONDANSETRON 4 MG: 2 INJECTION INTRAMUSCULAR; INTRAVENOUS at 15:36

## 2024-05-10 RX ADMIN — Medication 400 MCG: at 15:20

## 2024-05-10 RX ADMIN — LIDOCAINE HYDROCHLORIDE 100 MG: 20 INJECTION, SOLUTION EPIDURAL; INFILTRATION; INTRACAUDAL; PERINEURAL at 14:56

## 2024-05-10 RX ADMIN — Medication 10 L/MIN: at 15:56

## 2024-05-10 SDOH — SOCIAL STABILITY: SOCIAL INSECURITY: ARE YOU OR HAVE YOU BEEN THREATENED OR ABUSED PHYSICALLY, EMOTIONALLY, OR SEXUALLY BY ANYONE?: NO

## 2024-05-10 SDOH — SOCIAL STABILITY: SOCIAL INSECURITY: DO YOU FEEL ANYONE HAS EXPLOITED OR TAKEN ADVANTAGE OF YOU FINANCIALLY OR OF YOUR PERSONAL PROPERTY?: NO

## 2024-05-10 SDOH — SOCIAL STABILITY: SOCIAL INSECURITY: ABUSE: ADULT

## 2024-05-10 SDOH — SOCIAL STABILITY: SOCIAL INSECURITY: WERE YOU ABLE TO COMPLETE ALL THE BEHAVIORAL HEALTH SCREENINGS?: YES

## 2024-05-10 SDOH — SOCIAL STABILITY: SOCIAL INSECURITY: DO YOU FEEL UNSAFE GOING BACK TO THE PLACE WHERE YOU ARE LIVING?: NO

## 2024-05-10 SDOH — SOCIAL STABILITY: SOCIAL INSECURITY: HAVE YOU HAD ANY THOUGHTS OF HARMING ANYONE ELSE?: NO

## 2024-05-10 SDOH — SOCIAL STABILITY: SOCIAL INSECURITY: DOES ANYONE TRY TO KEEP YOU FROM HAVING/CONTACTING OTHER FRIENDS OR DOING THINGS OUTSIDE YOUR HOME?: NO

## 2024-05-10 SDOH — SOCIAL STABILITY: SOCIAL INSECURITY: ARE THERE ANY APPARENT SIGNS OF INJURIES/BEHAVIORS THAT COULD BE RELATED TO ABUSE/NEGLECT?: NO

## 2024-05-10 SDOH — HEALTH STABILITY: MENTAL HEALTH: CURRENT SMOKER: 0

## 2024-05-10 SDOH — SOCIAL STABILITY: SOCIAL INSECURITY: HAS ANYONE EVER THREATENED TO HURT YOUR FAMILY OR YOUR PETS?: NO

## 2024-05-10 SDOH — SOCIAL STABILITY: SOCIAL INSECURITY: HAVE YOU HAD THOUGHTS OF HARMING ANYONE ELSE?: NO

## 2024-05-10 ASSESSMENT — PAIN - FUNCTIONAL ASSESSMENT

## 2024-05-10 ASSESSMENT — ACTIVITIES OF DAILY LIVING (ADL)
FEEDING YOURSELF: DEPENDENT
PATIENT'S MEMORY ADEQUATE TO SAFELY COMPLETE DAILY ACTIVITIES?: YES
HEARING - LEFT EAR: FUNCTIONAL
BATHING: DEPENDENT
LACK_OF_TRANSPORTATION: NO
WALKS IN HOME: DEPENDENT
ASSISTIVE_DEVICE: WHEELCHAIR
ADEQUATE_TO_COMPLETE_ADL: YES
HEARING - LEFT EAR: FUNCTIONAL
HEARING - RIGHT EAR: FUNCTIONAL
HEARING - RIGHT EAR: FUNCTIONAL
TOILETING: DEPENDENT
JUDGMENT_ADEQUATE_SAFELY_COMPLETE_DAILY_ACTIVITIES: YES
ADEQUATE_TO_COMPLETE_ADL: YES
DRESSING YOURSELF: DEPENDENT
JUDGMENT_ADEQUATE_SAFELY_COMPLETE_DAILY_ACTIVITIES: YES
WALKS IN HOME: DEPENDENT
DRESSING YOURSELF: DEPENDENT
ASSISTIVE_DEVICE: WHEELCHAIR
GROOMING: DEPENDENT
BATHING: DEPENDENT
ADEQUATE_TO_COMPLETE_ADL: YES
PATIENT'S MEMORY ADEQUATE TO SAFELY COMPLETE DAILY ACTIVITIES?: YES
TOILETING: DEPENDENT
GROOMING: DEPENDENT
FEEDING YOURSELF: DEPENDENT
JUDGMENT_ADEQUATE_SAFELY_COMPLETE_DAILY_ACTIVITIES: YES
ADEQUATE_TO_COMPLETE_ADL: YES
PATIENT'S MEMORY ADEQUATE TO SAFELY COMPLETE DAILY ACTIVITIES?: YES

## 2024-05-10 ASSESSMENT — PAIN SCALES - GENERAL

## 2024-05-10 ASSESSMENT — COGNITIVE AND FUNCTIONAL STATUS - GENERAL
WALKING IN HOSPITAL ROOM: TOTAL
DAILY ACTIVITIY SCORE: 6
TURNING FROM BACK TO SIDE WHILE IN FLAT BAD: TOTAL
MOBILITY SCORE: 6
DRESSING REGULAR LOWER BODY CLOTHING: TOTAL
TOILETING: TOTAL
EATING MEALS: TOTAL
DRESSING REGULAR UPPER BODY CLOTHING: TOTAL
PATIENT BASELINE BEDBOUND: NO
CLIMB 3 TO 5 STEPS WITH RAILING: TOTAL
MOVING TO AND FROM BED TO CHAIR: TOTAL
HELP NEEDED FOR BATHING: TOTAL
MOVING FROM LYING ON BACK TO SITTING ON SIDE OF FLAT BED WITH BEDRAILS: TOTAL
STANDING UP FROM CHAIR USING ARMS: TOTAL
PERSONAL GROOMING: TOTAL

## 2024-05-10 ASSESSMENT — ENCOUNTER SYMPTOMS
PSYCHIATRIC NEGATIVE: 1
FEVER: 0
CARDIOVASCULAR NEGATIVE: 1

## 2024-05-10 ASSESSMENT — COLUMBIA-SUICIDE SEVERITY RATING SCALE - C-SSRS
6. HAVE YOU EVER DONE ANYTHING, STARTED TO DO ANYTHING, OR PREPARED TO DO ANYTHING TO END YOUR LIFE?: NO
2. HAVE YOU ACTUALLY HAD ANY THOUGHTS OF KILLING YOURSELF?: NO
1. IN THE PAST MONTH, HAVE YOU WISHED YOU WERE DEAD OR WISHED YOU COULD GO TO SLEEP AND NOT WAKE UP?: NO

## 2024-05-10 ASSESSMENT — LIFESTYLE VARIABLES
HOW MANY STANDARD DRINKS CONTAINING ALCOHOL DO YOU HAVE ON A TYPICAL DAY: PATIENT DOES NOT DRINK
HOW OFTEN DO YOU HAVE 6 OR MORE DRINKS ON ONE OCCASION: NEVER
HOW OFTEN DO YOU HAVE A DRINK CONTAINING ALCOHOL: NEVER
SKIP TO QUESTIONS 9-10: 1
AUDIT-C TOTAL SCORE: 0
AUDIT-C TOTAL SCORE: 0

## 2024-05-10 ASSESSMENT — PATIENT HEALTH QUESTIONNAIRE - PHQ9
1. LITTLE INTEREST OR PLEASURE IN DOING THINGS: NOT AT ALL
SUM OF ALL RESPONSES TO PHQ9 QUESTIONS 1 & 2: 0
2. FEELING DOWN, DEPRESSED OR HOPELESS: NOT AT ALL

## 2024-05-10 NOTE — ANESTHESIA PROCEDURE NOTES
Airway  Date/Time: 5/10/2024 2:57 PM  Urgency: elective    Airway not difficult    Staffing  Performed: MARVIN   Authorized by: Anthony Hendrickson MD    Performed by: MARVIN Garcia  Patient location during procedure: OR    Indications and Patient Condition  Indications for airway management: anesthesia  Spontaneous Ventilation: absent  Sedation level: deep  Preoxygenated: yes  MILS maintained throughout      Final Airway Details  Final airway type: endotracheal airway      Successful airway: ETT  Cuffed: yes   Endotracheal tube insertion site: tracheostomy  ETT size (mm): 5.0  Placement verified by: chest auscultation and capnometry   Measured from: stoma  Number of attempts at approach: 1    Additional Comments  Pt has indwelling tracheostomy. SIVI and Tenax 5.0 laser ETT placed into the stoma. No issues with placement or ventilation.

## 2024-05-10 NOTE — ANESTHESIA PREPROCEDURE EVALUATION
Patient: Wai Rodrigues III    Procedure Information       Date/Time: 05/10/24 1130    Procedure: Microlaryngoscopy; Bronchoscopy; CO2 Laser; Biopsy; Injection; Balloon Dilation    Location: [facility] A OR 09 / Virtual [facility] A OR    Surgeons: Rikki Hardin MD            Relevant Problems   Anesthesia (within normal limits)      Cardiac   (+) CAD (coronary artery disease) (CABG 5 years ago. No chest pain since)   (+) HTN (hypertension) (Resolved)      Neuro  Spinal cord injury 1 year ago. C4 fx.       Clinical information reviewed:    Allergies                NPO Detail:  NPO/Void Status  Date of Last Liquid: 05/10/24  Time of Last Liquid: 0720  Date of Last Solid: 05/09/24  Time of Last Solid: 1930  Last Intake Type: Clear fluids         Physical Exam    Airway  Mallampati: unable to assess     Cardiovascular    Dental    Pulmonary    Abdominal            Anesthesia Plan    History of general anesthesia?: yes  History of complications of general anesthesia?: no    ASA 3     general   (C4 quadraplegia. Trach dependent.)  The patient is not a current smoker.    intravenous induction   Anesthetic plan and risks discussed with patient.    Plan discussed with CAA.

## 2024-05-10 NOTE — DISCHARGE INSTRUCTIONS
Postoperative Care Instructions:  Microdirect Laryngoscopy/Bronchoscopy    Postoperative Care:  For your surgery, special microscopic instruments were used and an operating telescope was placed in your mouth to look at your vocal cords and trachea to treat your airway.      It is normal for your voice to be hoarse for several days or weeks after surgery while the healing process occurs.     Finally, make sure to drink plenty of water to stay well hydrated after surgery, as this will help to speed your recovery by keeping your secretions thin and your vocal cords moist.  Use of cough lozenges is also allowed.      Diet: You may resume your normal diet after surgery. You may want to start out with liquids and light meals or soft foods and advance to your usual diet as tolerated.     Our Nurse will contact you a few days after surgery to schedule your follow up appointment, which is typically 3-6 weeks after surgery.  For any questions or concerns, please call the respective office between the hours of 9am-4pm.   Please call:  Dr. Hardin's office @ [phone]  Dr. Kebede's office @ [phone]   Dr. Jules's office @ [phone]  If issues arise over the weekend or after hours, please call our hospital  at [phone] and ask for the ENT resident on call.    What to Expect Following Surgery:    The following are some symptoms that may follow your recent surgery:    Pain - Some mild pain is normal after surgery.  As adequate pain control is necessary for healing, please take over-the-counter Tylenol or Motrin per the package directions as needed for pain.  Occasionally, a narcotic pain medication is prescribed for your use after surgery.  If this is the case, please take the medication only as directed.  You may need to take an over-the-counter stool softener as narcotic pain medications can cause constipation. Massage, cold packs, relaxation techniques, listening to music, and positive thinking will also  help control your postoperative pain.    Taste disturbance and/or tongue numbness - Due to the surgical instruments used during surgery, you may experience tongue numbness and/or taste disturbance after surgery, but this is usually temporary.  It may take 1-4 weeks to completely resolve.  It is also normal for your tongue to feel swollen or bruised after surgery, and this will also resolve in a few days.    Bleeding - For a few days after surgery, it is normal to cough up some blood in your mucus.  However, if you experience continuous bright red bleeding from your mouth or if you are coughing up blood clots, please call your doctor's office immediately.  If you are on any blood thinning medications, such as Aspirin, Plavix, or Coumadin, you may restart them immediately after surgery unless you were specifically told not to do so by your surgeon.    Muscle aches/soreness - You may experience generalized muscle aches and/or soreness after surgery, and this is a normal effect of anesthesia.  They should completely resolve after a few days.     Swelling/throat tightness - If you were given steroid medication, this can help with swelling and should be taken as directed. The side effects from steroid use is typically minimal when taken for short periods, as used in these cases. If you have questions, please discuss with your pharmacist.

## 2024-05-10 NOTE — ANESTHESIA POSTPROCEDURE EVALUATION
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    Anesthesia Post Evaluation    Patient participation: complete - patient participated  Level of consciousness: awake  Pain management: adequate  Airway patency: patent  Cardiovascular status: acceptable and hemodynamically stable  Respiratory status: acceptable (Still requiring O2 via trach collar and still having secretions.  ENT resident scoped at bedside.  Pt will be admitted for further observation)  Hydration status: acceptable  Postoperative Nausea and Vomiting: none        No notable events documented.

## 2024-05-10 NOTE — OP NOTE
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]     Procedures  Microlaryngoscopy; Bronchoscopy; CO2 Laser; Biopsy; Injection; Balloon Dilation  47241 - IN LARYNGOSCOPY W/WO TRACHEOSCOPY W/MICRO/TELESCOPE    IN LARYNGOSCOPY W/BIOPSY MICROSCOPE/TELESCOPE [16446]  IN LARGSC EXC NICOLASA&/STRPG CORDS/EPIGL MCRSCP/TLSCP [68093]  IN LARGSC W/NJX VOCAL CORD THER W/MICRO/TELESCOPE [66715]  IN BRNCHSC W/TRACHEAL/BRONCHIAL DILAT/CLSD RDCTJ FX [91162]  Surgeons      * Rikki Hardin - Primary    Resident/Fellow/Other Assistant:  Surgeons and Role:  * No surgeons found with a matching role *    Procedure Summary  Anesthesia: General  ASA: III  Anesthesia Staff: Anesthesiologist: Dominguez aCballero MD  C-AA: MARVIN Garcia  Estimated Blood Loss: 5mL  Intra-op Medications: Administrations occurring from 1130 to 1300 on 05/10/24:  * No intraprocedure medications in log *           Anesthesia Record               Intraprocedure I/O Totals          Intake    LR infusion 1100.00 mL    Total Intake 1100 mL       Output    Est. Blood Loss 5 mL    Total Output 5 mL       Net    Net Volume 1095 mL          Specimen: No specimens collected     Staff:   Circulator: Rena Shaver RN; Fozia Sawant, RN  Relief Circulator: Aleksandra Ivan RN  Relief Scrub: Reina Agee  Scrub Person: Rosalia Rangel         Drains and/or Catheters:   Urethral Catheter (Active)   Site Assessment Clean;Skin intact 05/10/24 1107   Collection Container Leg bag 05/10/24 1556   Reason for Continuing Urinary Catheterization chronic urinary retention 05/10/24 1107       Findings: Granulation tissue tracking vertically both on left and right sides of stoma excised with  cups forceps with balloon dilation to 6 lester on first and second pass.  Notable improvement in airway diameter after removing with scalpel/blunt dissection. Normal appearing trachea down to antwon and main stem but with a wider vestibule anterior to tracheal stoma.  Areas where granulation tissue was excised were injected with steroids.    Indications: Wai Rodrigues III is an 68 y.o. 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 He has a history of quadriplegia after a fall. He was intubated for 5 weeks before tracheostomy performed. He was capped for a short period of time, but did not tolerate this well. Secondary to the above in conjunction with difficult office exam, recommended operative direct laryngoscopy, stomoplasty and balloon dilation to improve airway diameter.    The patient was seen in the preoperative area. The risks, benefits, complications, treatment options, non-operative alternatives, expected recovery and outcomes were discussed with the patient. The possibilities of reaction to medication, pulmonary aspiration, injury to surrounding structures, bleeding, recurrent infection, the need for additional procedures, failure to diagnose a condition, and creating a complication requiring transfusion or operation were discussed with the patient. The patient concurred with the proposed plan, giving informed consent.  The site of surgery was properly noted/marked if necessary per policy. The patient has been actively warmed in preoperative area. Preoperative antibiotics are not indicated. 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  + mild dysphonia due to reduced airflow over the TVC and some scar involving the left  TVC.     Procedure: The patient was  brought back to the operating room and transferred to the operating room table. The bed was turned 90° to the laryngology team. General anesthesia was introduced; tracheostomy tube was replaced with 5-0 endotracheal tube which was intermittently removed and replaced throughout the case for visualization purposes.    A dental guard was placed on the upper dentition.  The dedo laryngoscope was introduced transorally along the right side of the tongue.  No concerning masses or lesions were identified in the oral cavity, oropharynx or pharynx. The dedo was advanced to the level of the glottic inlet and placed in suspension.     A rigid telescope was used to inspect the airway with findings as noted above. Photodocumentation was performed.     Patient was preoxygenated and endotracheal tube was briefly removed, and redundant granulation tissue localizing vertically along the stomal trach bilaterally as noted in the findings was grasped and excised with cups forceps until notable improvement in airway was noted with rigid telescope. Endotracheal tube was replaced and patient was again preoxygenated until it was removed again for balloon dilation. Appropriate hemostasis was observed at this time. To further aid in airway diameter, tracheal balloon dilation was performed twice, both directly inferior and then superior to the patient's stoma, to a level of 6 lester each time. At this time, the underlying areas of excised granulation tissue were injected with steroids, and endotracheal tube was once again replaced. Dedo was taken out of suspension and removed from the mouth along with patient's mouth guard. The patient was then turned back to the anesthesia team for emergence; endotracheal tube was replaced with patient's 6-0 shiley tracheostomy tube.  The patient was transferred to the PACU in stable condition.        Complications:  None; patient tolerated the procedure well.    Disposition: PACU - hemodynamically  stable.  Condition: stable     Attending Attestation: I was present for the entirety of the procedure(s).     Addendum: Patient with increased O2 requirement post-op.  Decision to admit and provide additional support/increased suctioning x overnight.      Rikki Hardin MD

## 2024-05-10 NOTE — H&P
History Of Present Illness  Wai Rodrigues III is a 68 y.o. male presenting with a history of quadriplegia after a fall. He was intubated for 5 weeks before tracheostomy performed. He was capped for a short period of time, but did not tolerate this well.      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 Difficult exam in office. I anticipate a stomoplasty and balloon dilation/CO2 will improve airway diameter. He will be scheduled for this at a time that is convenient for him. He would like this done at [facility] as they live in [place], [place].  Planned tracheostomy change at that time.      Past Medical History  No past medical history on file.    Surgical History  No past surgical history on file.     Social History  He has no history on file for tobacco use, alcohol use, and drug use.    Family History  No family history on file.     Allergies  Patient has no known allergies.    Review of Systems   Constitutional:  Negative for fever.   Cardiovascular: Negative.  Negative for chest pain.   Genitourinary: Negative.    Skin: Negative.    Psychiatric/Behavioral: Negative.     All other systems reviewed and are negative.       Physical Exam  Constitutional:       General: He is not in acute distress.     Appearance: He is not ill-appearing.   HENT:      Right Ear: External ear normal.      Left Ear: External ear normal.      Nose: Nose normal.      Mouth/Throat:      Mouth: Mucous membranes are moist.   Eyes:      Pupils: Pupils are equal, round, and reactive to light.   Pulmonary:      Effort: Pulmonary effort is normal.   Skin:     General: Skin is warm and dry.   Neurological:      General: No focal deficit present.   Psychiatric:         Mood and Affect: Mood normal.          Assessment/Plan   Principal Problem:    Tracheal stenosis      Patient  and I discussed the risks, benefits and alternatives to surgery and all questions answered.  Cleared to OR.      Rikki Hardin MD

## 2024-05-11 VITALS
WEIGHT: 185 LBS | DIASTOLIC BLOOD PRESSURE: 70 MMHG | SYSTOLIC BLOOD PRESSURE: 117 MMHG | BODY MASS INDEX: 28.04 KG/M2 | RESPIRATION RATE: 17 BRPM | HEART RATE: 84 BPM | OXYGEN SATURATION: 94 % | TEMPERATURE: 98.8 F | HEIGHT: 68 IN

## 2024-05-11 LAB
ALBUMIN SERPL BCP-MCNC: 2.9 G/DL (ref 3.4–5)
ANION GAP SERPL CALC-SCNC: 11 MMOL/L (ref 10–20)
BASOPHILS # BLD AUTO: 0.01 X10*3/UL (ref 0–0.1)
BASOPHILS NFR BLD AUTO: 0.1 %
BUN SERPL-MCNC: 17 MG/DL (ref 6–23)
CALCIUM SERPL-MCNC: 8.9 MG/DL (ref 8.6–10.3)
CHLORIDE SERPL-SCNC: 102 MMOL/L (ref 98–107)
CO2 SERPL-SCNC: 26 MMOL/L (ref 21–32)
CREAT SERPL-MCNC: 0.52 MG/DL (ref 0.5–1.3)
EGFRCR SERPLBLD CKD-EPI 2021: >90 ML/MIN/1.73M*2
EOSINOPHIL # BLD AUTO: 0 X10*3/UL (ref 0–0.7)
EOSINOPHIL NFR BLD AUTO: 0 %
ERYTHROCYTE [DISTWIDTH] IN BLOOD BY AUTOMATED COUNT: 16.4 % (ref 11.5–14.5)
GLUCOSE SERPL-MCNC: 237 MG/DL (ref 74–99)
HCT VFR BLD AUTO: 34.3 % (ref 41–52)
HGB BLD-MCNC: 11.2 G/DL (ref 13.5–17.5)
IMM GRANULOCYTES # BLD AUTO: 0.16 X10*3/UL (ref 0–0.7)
IMM GRANULOCYTES NFR BLD AUTO: 0.9 % (ref 0–0.9)
LYMPHOCYTES # BLD AUTO: 0.24 X10*3/UL (ref 1.2–4.8)
LYMPHOCYTES NFR BLD AUTO: 1.3 %
MAGNESIUM SERPL-MCNC: 1.8 MG/DL (ref 1.6–2.4)
MCH RBC QN AUTO: 26.4 PG (ref 26–34)
MCHC RBC AUTO-ENTMCNC: 32.7 G/DL (ref 32–36)
MCV RBC AUTO: 81 FL (ref 80–100)
MONOCYTES # BLD AUTO: 0.72 X10*3/UL (ref 0.1–1)
MONOCYTES NFR BLD AUTO: 3.9 %
NEUTROPHILS # BLD AUTO: 17.49 X10*3/UL (ref 1.2–7.7)
NEUTROPHILS NFR BLD AUTO: 93.8 %
NRBC BLD-RTO: 0 /100 WBCS (ref 0–0)
PHOSPHATE SERPL-MCNC: 2.7 MG/DL (ref 2.5–4.9)
PLATELET # BLD AUTO: 264 X10*3/UL (ref 150–450)
POTASSIUM SERPL-SCNC: 4.3 MMOL/L (ref 3.5–5.3)
RBC # BLD AUTO: 4.25 X10*6/UL (ref 4.5–5.9)
SODIUM SERPL-SCNC: 135 MMOL/L (ref 136–145)
WBC # BLD AUTO: 18.6 X10*3/UL (ref 4.4–11.3)

## 2024-05-11 PROCEDURE — 2500000005 HC RX 250 GENERAL PHARMACY W/O HCPCS: Performed by: OTOLARYNGOLOGY

## 2024-05-11 PROCEDURE — 36415 COLL VENOUS BLD VENIPUNCTURE: CPT | Performed by: STUDENT IN AN ORGANIZED HEALTH CARE EDUCATION/TRAINING PROGRAM

## 2024-05-11 PROCEDURE — 80069 RENAL FUNCTION PANEL: CPT | Performed by: STUDENT IN AN ORGANIZED HEALTH CARE EDUCATION/TRAINING PROGRAM

## 2024-05-11 PROCEDURE — 85025 COMPLETE CBC W/AUTO DIFF WBC: CPT | Performed by: STUDENT IN AN ORGANIZED HEALTH CARE EDUCATION/TRAINING PROGRAM

## 2024-05-11 PROCEDURE — 2500000002 HC RX 250 W HCPCS SELF ADMINISTERED DRUGS (ALT 637 FOR MEDICARE OP, ALT 636 FOR OP/ED): Performed by: OTOLARYNGOLOGY

## 2024-05-11 PROCEDURE — 2500000001 HC RX 250 WO HCPCS SELF ADMINISTERED DRUGS (ALT 637 FOR MEDICARE OP): Performed by: STUDENT IN AN ORGANIZED HEALTH CARE EDUCATION/TRAINING PROGRAM

## 2024-05-11 PROCEDURE — 2500000004 HC RX 250 GENERAL PHARMACY W/ HCPCS (ALT 636 FOR OP/ED): Performed by: STUDENT IN AN ORGANIZED HEALTH CARE EDUCATION/TRAINING PROGRAM

## 2024-05-11 PROCEDURE — 83735 ASSAY OF MAGNESIUM: CPT | Performed by: STUDENT IN AN ORGANIZED HEALTH CARE EDUCATION/TRAINING PROGRAM

## 2024-05-11 PROCEDURE — 94640 AIRWAY INHALATION TREATMENT: CPT

## 2024-05-11 RX ORDER — ACETAMINOPHEN 325 MG/1
650 TABLET ORAL EVERY 6 HOURS PRN
Qty: 30 TABLET | Refills: 0 | Status: SHIPPED | OUTPATIENT
Start: 2024-05-11

## 2024-05-11 RX ADMIN — IPRATROPIUM BROMIDE AND ALBUTEROL SULFATE 3 ML: 2.5; .5 SOLUTION RESPIRATORY (INHALATION) at 07:56

## 2024-05-11 RX ADMIN — ACETYLCYSTEINE 50 MG: 200 SOLUTION ORAL; RESPIRATORY (INHALATION) at 08:05

## 2024-05-11 RX ADMIN — HEPARIN SODIUM 5000 UNITS: 5000 INJECTION INTRAVENOUS; SUBCUTANEOUS at 05:46

## 2024-05-11 RX ADMIN — ATORVASTATIN CALCIUM 80 MG: 80 TABLET, FILM COATED ORAL at 09:35

## 2024-05-11 RX ADMIN — POLYETHYLENE GLYCOL 3350 17 G: 17 POWDER, FOR SOLUTION ORAL at 09:35

## 2024-05-11 ASSESSMENT — COGNITIVE AND FUNCTIONAL STATUS - GENERAL
TOILETING: TOTAL
CLIMB 3 TO 5 STEPS WITH RAILING: TOTAL
PERSONAL GROOMING: TOTAL
DAILY ACTIVITIY SCORE: 6
CLIMB 3 TO 5 STEPS WITH RAILING: TOTAL
EATING MEALS: TOTAL
EATING MEALS: TOTAL
HELP NEEDED FOR BATHING: TOTAL
WALKING IN HOSPITAL ROOM: TOTAL
DRESSING REGULAR LOWER BODY CLOTHING: TOTAL
DRESSING REGULAR UPPER BODY CLOTHING: TOTAL
PERSONAL GROOMING: TOTAL
DRESSING REGULAR UPPER BODY CLOTHING: TOTAL
DRESSING REGULAR LOWER BODY CLOTHING: TOTAL
HELP NEEDED FOR BATHING: TOTAL
STANDING UP FROM CHAIR USING ARMS: TOTAL
WALKING IN HOSPITAL ROOM: TOTAL
MOBILITY SCORE: 6
TURNING FROM BACK TO SIDE WHILE IN FLAT BAD: TOTAL
TURNING FROM BACK TO SIDE WHILE IN FLAT BAD: TOTAL
MOVING TO AND FROM BED TO CHAIR: TOTAL
MOVING TO AND FROM BED TO CHAIR: TOTAL
STANDING UP FROM CHAIR USING ARMS: TOTAL
MOBILITY SCORE: 6
TOILETING: TOTAL
MOVING FROM LYING ON BACK TO SITTING ON SIDE OF FLAT BED WITH BEDRAILS: TOTAL
DAILY ACTIVITIY SCORE: 6
MOVING FROM LYING ON BACK TO SITTING ON SIDE OF FLAT BED WITH BEDRAILS: TOTAL

## 2024-05-11 ASSESSMENT — PAIN SCALES - GENERAL
PAINLEVEL_OUTOF10: 0 - NO PAIN

## 2024-05-11 ASSESSMENT — PAIN - FUNCTIONAL ASSESSMENT
PAIN_FUNCTIONAL_ASSESSMENT: 0-10
PAIN_FUNCTIONAL_ASSESSMENT: 0-10

## 2024-05-11 NOTE — CARE PLAN
Problem: Pain - Adult  Goal: Verbalizes/displays adequate comfort level or baseline comfort level  Outcome: Progressing     Problem: Safety - Adult  Goal: Free from fall injury  Outcome: Progressing     Problem: Discharge Planning  Goal: Discharge to home or other facility with appropriate resources  Outcome: Progressing     Problem: Chronic Conditions and Co-morbidities  Goal: Patient's chronic conditions and co-morbidity symptoms are monitored and maintained or improved  Outcome: Progressing     Problem: Skin  Goal: Decreased wound size/increased tissue granulation at next dressing change  Outcome: Progressing  Goal: Participates in plan/prevention/treatment measures  Outcome: Progressing  Goal: Prevent/manage excess moisture  Outcome: Progressing  Goal: Prevent/minimize sheer/friction injuries  Outcome: Progressing  Goal: Promote/optimize nutrition  Outcome: Progressing  Goal: Promote skin healing  Outcome: Progressing     Problem: Fall/Injury  Goal: Not fall by end of shift  Outcome: Progressing  Goal: Be free from injury by end of the shift  Outcome: Progressing  Goal: Verbalize understanding of personal risk factors for fall in the hospital  Outcome: Progressing  Goal: Verbalize understanding of risk factor reduction measures to prevent injury from fall in the home  Outcome: Progressing  Goal: Use assistive devices by end of the shift  Outcome: Progressing  Goal: Pace activities to prevent fatigue by end of the shift  Outcome: Progressing   The patient's goals for the shift include      The clinical goals for the shift include pain management

## 2024-05-11 NOTE — CODE DOCUMENTATION
Pt expressed SOB/SpO2- 72%/decreased BP; Rapid Response called. Trach cannula was obstructed w/blood clots. Respiratory cleared cannula/suctioned patient. 70% O2 w/humidification added. 500 LR bolus given. Patient's vitals improved. Will continue to monitor.

## 2024-05-11 NOTE — SIGNIFICANT EVENT
Rapid called due to pt stating he is unable to breathe and spo2 in 70%, when RT arrived inner cannula removed, and inner cannula was occluded due to a blood clot. Rt suctioned moderate amount of bright red blood and inner cannula replaced. Humidified oxygen added at 70% trach collar and spo2 increased to 94%. MD at bedside and Rt will continue to monitor patient to wean oxygen.

## 2024-05-11 NOTE — POST-PROCEDURE NOTE
Overnight had an episode with mucus plug.  Required suctioning and returned to 40% O2 via trach collar with humidity.  This AM feels much better, trach appears dry and no mucus or blood in airway.  States breathing feels normal.      CXR yesterday with low volumes - likely secondary to anesthesia effects.  Better today and anticipate DC later today.  Will ween to R/A.    N. Luciano Hardin MD ALIDA FACS  , [employer]  Director,  [facility]  [facility] [place]  Office: [phone]  Nurse: Mckayla Mathew  : Enrike Rudd      ENT Consults: [id number]  ENT Overnight (5p-6a), and Weekends: [id number]  ENT Head and Neck Surgery Phone: [phone]  ENT Peds: [id number]  ENT Outpatient scheduling number: [phone]        Weaned to RA and maintaining Sats >92%.  Tracheostoma stable and clean.  Will DC home.

## 2024-05-11 NOTE — DISCHARGE SUMMARY
Discharge Diagnosis  Tracheal stenosis    Issues Requiring Follow-Up  Follow up with Dr. Hardin as scheduled    Test Results Pending At Discharge  Pending Labs       No current pending labs.            Hospital Course   Admitted post-op from airway surgery due to increased secretions and low lung volumes on CXR likely secondary to anesthesia effects.  Had an episode of mucus plug relieved with removal of the inner canula and suctioning.  In AM, trach site clean and no significant secretions.  Sats >92% on RA.  Tolerating diet.     Pertinent Physical Exam At Time of Discharge  Physical Exam  Constitutional:       General: He is not in acute distress.     Appearance: He is not ill-appearing.   Neck:      Comments: Tracheostoma clean and dry.  No secretions noted. Inner canula clean.  Neck straps are 2 fingers tight.   Pulmonary:      Effort: No tachypnea, accessory muscle usage or respiratory distress.      Breath sounds: No stridor or decreased air movement.   Neurological:      Mental Status: Mental status is at baseline.         Home Medications     Medication List      CHANGE how you take these medications     * acetaminophen 650 mg/20.3 mL solution oral liquid; Commonly known as:   Tylenol; What changed: Another medication with the same name was added.   Make sure you understand how and when to take each.   * acetaminophen 325 mg tablet; Commonly known as: Tylenol; What changed:   Another medication with the same name was added. Make sure you understand   how and when to take each.   * acetaminophen 325 mg tablet; Commonly known as: Tylenol; Take 2   tablets (650 mg) by mouth every 6 hours if needed for moderate pain (4 -   6).; What changed: You were already taking a medication with the same   name, and this prescription was added. Make sure you understand how and   when to take each.  * This list has 3 medication(s) that are the same as other medications   prescribed for you. Read the directions carefully, and  ask your doctor or   other care provider to review them with you.     CONTINUE taking these medications     acetylcysteine 100 mg/mL (10 %) nebulizer solution; Commonly known as:   Mucomyst   ascorbic acid 500 mg ER capsule; Commonly known as: Vitamin C   aspirin 81 mg chewable tablet   atorvastatin 80 mg tablet; Commonly known as: Lipitor   Cerovite Jr chewable tablet; Generic drug: multivitamins with iron   clonazePAM 1 mg tablet; Commonly known as: KlonoPIN   FLUoxetine 20 mg capsule; Commonly known as: PROzac   LiquaceL  gram-kcal/30 mL liquid; Generic drug: amino   acids-protein hydrolys   melatonin 3 mg capsule   mirtazapine 30 mg disintegrating tablet; Commonly known as: Remeron   Sol-Tab   traZODone 50 mg tablet; Commonly known as: Desyrel     STOP taking these medications     cyclobenzaprine 10 mg tablet; Commonly known as: Flexeril       Outpatient Follow-Up  Future Appointments   Date Time Provider Department Center   6/20/2024 10:30 AM Rikki Hardin MD [id number]       Rikki Hardin MD

## 2024-05-11 NOTE — NURSING NOTE
Rapid Response Nurse Note:     Wai Rodrigues III is a 68 y.o. male on day 1 of admission presenting with Tracheal stenosis.    Rounded on patient due to recent rapid response, reviewed patient chart and checked with bedside nurse for any questions or concerns, and none were voiced at this time.    Patient current RADAR score is 0    /70 (BP Location: Left arm, Patient Position: Lying)   Pulse 84   Temp 37.1 °C (98.8 °F) (Temporal)   Resp 17   Wt 83.9 kg (185 lb)   SpO2 94%     No signs/symptoms of distress at this time. Bedside nurse notified to escalate concerns if patient condition changes      Braulio Martinez RN

## 2024-06-20 ENCOUNTER — APPOINTMENT (OUTPATIENT)
Dept: OTOLARYNGOLOGY | Facility: CLINIC | Age: 69
End: 2024-06-20
Payer: MEDICARE

## 2024-06-20 VITALS — BODY MASS INDEX: 28.13 KG/M2 | HEIGHT: 68 IN

## 2024-06-20 DIAGNOSIS — Z43.0 ATTENTION TO TRACHEOSTOMY (MULTI): Primary | ICD-10-CM

## 2024-06-20 DIAGNOSIS — G82.50: ICD-10-CM

## 2024-06-20 DIAGNOSIS — J39.8 TRACHEAL STENOSIS: ICD-10-CM

## 2024-06-20 PROCEDURE — 31615 TRCHEOBRNCHSC EST TRACHS INC: CPT | Performed by: OTOLARYNGOLOGY

## 2024-06-20 PROCEDURE — 1159F MED LIST DOCD IN RCRD: CPT | Performed by: OTOLARYNGOLOGY

## 2024-06-20 PROCEDURE — 99213 OFFICE O/P EST LOW 20 MIN: CPT | Performed by: OTOLARYNGOLOGY

## 2024-06-20 ASSESSMENT — PATIENT HEALTH QUESTIONNAIRE - PHQ9
SUM OF ALL RESPONSES TO PHQ9 QUESTIONS 1 AND 2: 0
2. FEELING DOWN, DEPRESSED OR HOPELESS: NOT AT ALL
1. LITTLE INTEREST OR PLEASURE IN DOING THINGS: NOT AT ALL

## 2024-06-20 NOTE — PROGRESS NOTES
ASSESSMENT AND PLAN:   Wai Rodrigues III is a 68 y.o. male with a history of fall with quadriplegia secondary requiring surgery to remove granulation tissue. He is doing well with improved breathing. No granulation tissue seen on exam. He is trent to breathe well with finger occlusion and capping.    He will plan for a capping trial for a week. We will plan for decannulation after the trial.      Reason For Consult  No chief complaint on file.    Post op 5/10/24: Granulation tissue tracking vertically both on left and right sides of stoma excised with cups forceps with balloon dilation to 6 lester on first and second pass.  Notable improvement in airway diameter after removing with scalpel/blunt dissection. Normal appearing trachea down to antwon and main stem but with a wider vestibule anterior to tracheal stoma.  Areas where granulation tissue was excised were injected with steroids.    Possible tracheoplasty with scalpel over the balloon. Vs laser.    Quadraplegia after a fall, suprapubic catheter. [facility] Dissatisfied with breathing treatments.      HISTORY OF PRESENT ILLNESS:  Wai Rodrigues III is a 68 y.o. male presenting for a follow up visit with me for POV s/p removal of granulation tissue.      The patient reports that his symptoms are improving. He has noticed decreased suctioning requirements.      Prior History:   Seen last on 03/21/2024  with a history of quadriplegia after a fall. He was intubated for 5 weeks before trach. He was capped for a short period of time, but did not tolerate this.       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 No stenosis and I anticipate a stomoplasty and balloon dilation/CO2 will improve airway diameter. He will be scheduled for this at a time that is convenient for him. He would like  this done at [facility] as they live in [place], [place].     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 Surgical History  He has a past surgical history that includes Coronary artery bypass graft; Aortic valve replacement; PEG w/tracheostomy placement; PEG tube removal; and Cervical spine surgery.   Social History  He reports that he has never smoked. He has never used smokeless tobacco. He reports that he does not currently use alcohol. He reports that he does not use drugs. Allergies  Patient has no known allergies.     Family History  No family history on file.    Review of Systems  All 10 systems were reviewed and negative except for above.      Last Recorded Vitals  There were no vitals taken for this visit.    Physical Exam  ENT Physical Exam  Constitutional  Appearance: patient appears well-developed and well-nourished,  Head and Face  Appearance: head appears normal and face appears normal;  Ear  Auricles: right auricle normal; left auricle normal;  Nose  External Nose: nares patent bilaterally;  Oral Cavity/Oropharynx  Lips: normal;  Neck  Neck: neck palpation normal; tracheostomy noted;  Respiratory  Inspection: no retractions visible;  Cardiovascular  Inspection: no peripheral edema present;  Neurovestibular  Mental Status: alert and oriented;  Psychiatric: mood normal;  Cranial Nerves: cranial nerves intact;           Procedures     PROCEDURE:    Indications: difficulty breathing  PROCEDURE NOTE: BRONCHOSCOPY VIA TRACHEOSTOMY   I recommended bronchoscopy via tracheostomy based on PE findings, and/or concern for pathology based upon history of airway complaints. Risks, benefits, and alternatives were explained. The patient wishes to proceed and gives verbal consent.  Patient is  seated in the exam chair. After adequate topical anesthesia, I advance the flexible endoscope. The examination included evaluation of the calderon, vallecula, base of tongue, pyriforms, post-cricoid area, larynx and immediate subglottis.  Findings : assessment of the nasopharynx, base of tongue/vallecula, pyriform sinuses, post-cricoid area and pharyngeal walls was without lesion or mass and pharyngeal wall contraction is normal and symmetric      Time Spent  Prep time on day of patient encounter: 10 minutes  Time spent directly with patient, family or caregiver: 15 minutes  Additional Time Spent on Patient Care Activities/Discussion with SLP re care plan: 5 minutes  Documentation Time: 10 minutes  Other Time Spent: 0 minutes  Total: 40 minutes       Scribe Attestation  By signing my name below, I, Edna Keith , Scribe attest that this documentation has been prepared under the direction and in the presence of Rikki Hardin MD.

## 2024-06-27 ENCOUNTER — APPOINTMENT (OUTPATIENT)
Dept: OTOLARYNGOLOGY | Facility: CLINIC | Age: 69
End: 2024-06-27
Payer: MEDICARE

## 2024-07-09 ENCOUNTER — APPOINTMENT (OUTPATIENT)
Dept: RADIOLOGY | Facility: HOSPITAL | Age: 69
End: 2024-07-09
Payer: MEDICARE

## 2024-07-09 ENCOUNTER — HOSPITAL ENCOUNTER (EMERGENCY)
Facility: HOSPITAL | Age: 69
Discharge: HOME | End: 2024-07-09
Attending: EMERGENCY MEDICINE
Payer: MEDICARE

## 2024-07-09 VITALS
HEART RATE: 57 BPM | TEMPERATURE: 98.9 F | OXYGEN SATURATION: 95 % | RESPIRATION RATE: 20 BRPM | BODY MASS INDEX: 28.04 KG/M2 | WEIGHT: 185 LBS | HEIGHT: 68 IN | DIASTOLIC BLOOD PRESSURE: 90 MMHG | SYSTOLIC BLOOD PRESSURE: 122 MMHG

## 2024-07-09 DIAGNOSIS — T83.511A URINARY TRACT INFECTION ASSOCIATED WITH INDWELLING URETHRAL CATHETER, INITIAL ENCOUNTER (CMS-HCC): ICD-10-CM

## 2024-07-09 DIAGNOSIS — G90.4 AUTONOMIC DYSREFLEXIA: Primary | ICD-10-CM

## 2024-07-09 DIAGNOSIS — N39.0 URINARY TRACT INFECTION ASSOCIATED WITH INDWELLING URETHRAL CATHETER, INITIAL ENCOUNTER (CMS-HCC): ICD-10-CM

## 2024-07-09 DIAGNOSIS — T83.010A SUPRAPUBIC CATHETER DYSFUNCTION, INITIAL ENCOUNTER (CMS-HCC): ICD-10-CM

## 2024-07-09 LAB
ALBUMIN SERPL BCP-MCNC: 3.4 G/DL (ref 3.4–5)
ALP SERPL-CCNC: 116 U/L (ref 33–136)
ALT SERPL W P-5'-P-CCNC: 24 U/L (ref 10–52)
ANION GAP SERPL CALC-SCNC: 14 MMOL/L (ref 10–20)
APPEARANCE UR: ABNORMAL
AST SERPL W P-5'-P-CCNC: 15 U/L (ref 9–39)
BACTERIA #/AREA URNS AUTO: ABNORMAL /HPF
BASOPHILS # BLD AUTO: 0.04 X10*3/UL (ref 0–0.1)
BASOPHILS NFR BLD AUTO: 0.6 %
BILIRUB SERPL-MCNC: 0.3 MG/DL (ref 0–1.2)
BILIRUB UR STRIP.AUTO-MCNC: NEGATIVE MG/DL
BUN SERPL-MCNC: 20 MG/DL (ref 6–23)
CALCIUM SERPL-MCNC: 8.6 MG/DL (ref 8.6–10.3)
CHLORIDE SERPL-SCNC: 104 MMOL/L (ref 98–107)
CO2 SERPL-SCNC: 26 MMOL/L (ref 21–32)
COLOR UR: ABNORMAL
CREAT SERPL-MCNC: 0.42 MG/DL (ref 0.5–1.3)
EGFRCR SERPLBLD CKD-EPI 2021: >90 ML/MIN/1.73M*2
EOSINOPHIL # BLD AUTO: 0.16 X10*3/UL (ref 0–0.7)
EOSINOPHIL NFR BLD AUTO: 2.2 %
ERYTHROCYTE [DISTWIDTH] IN BLOOD BY AUTOMATED COUNT: 18.2 % (ref 11.5–14.5)
GLUCOSE SERPL-MCNC: 144 MG/DL (ref 74–99)
GLUCOSE UR STRIP.AUTO-MCNC: NORMAL MG/DL
HCT VFR BLD AUTO: 41 % (ref 41–52)
HGB BLD-MCNC: 12.9 G/DL (ref 13.5–17.5)
IMM GRANULOCYTES # BLD AUTO: 0.03 X10*3/UL (ref 0–0.7)
IMM GRANULOCYTES NFR BLD AUTO: 0.4 % (ref 0–0.9)
KETONES UR STRIP.AUTO-MCNC: NEGATIVE MG/DL
LEUKOCYTE ESTERASE UR QL STRIP.AUTO: ABNORMAL
LYMPHOCYTES # BLD AUTO: 1.14 X10*3/UL (ref 1.2–4.8)
LYMPHOCYTES NFR BLD AUTO: 16 %
MAGNESIUM SERPL-MCNC: 1.9 MG/DL (ref 1.6–2.4)
MCH RBC QN AUTO: 26.9 PG (ref 26–34)
MCHC RBC AUTO-ENTMCNC: 31.5 G/DL (ref 32–36)
MCV RBC AUTO: 85 FL (ref 80–100)
MONOCYTES # BLD AUTO: 0.51 X10*3/UL (ref 0.1–1)
MONOCYTES NFR BLD AUTO: 7.2 %
MUCOUS THREADS #/AREA URNS AUTO: ABNORMAL /LPF
NEUTROPHILS # BLD AUTO: 5.25 X10*3/UL (ref 1.2–7.7)
NEUTROPHILS NFR BLD AUTO: 73.6 %
NITRITE UR QL STRIP.AUTO: NEGATIVE
NRBC BLD-RTO: 0 /100 WBCS (ref 0–0)
PH UR STRIP.AUTO: 7.5 [PH]
PLATELET # BLD AUTO: 288 X10*3/UL (ref 150–450)
POTASSIUM SERPL-SCNC: 3.9 MMOL/L (ref 3.5–5.3)
PROT SERPL-MCNC: 7.3 G/DL (ref 6.4–8.2)
PROT UR STRIP.AUTO-MCNC: ABNORMAL MG/DL
RBC # BLD AUTO: 4.8 X10*6/UL (ref 4.5–5.9)
RBC # UR STRIP.AUTO: ABNORMAL /UL
RBC #/AREA URNS AUTO: ABNORMAL /HPF
SODIUM SERPL-SCNC: 140 MMOL/L (ref 136–145)
SP GR UR STRIP.AUTO: 1.01
UROBILINOGEN UR STRIP.AUTO-MCNC: NORMAL MG/DL
WBC # BLD AUTO: 7.1 X10*3/UL (ref 4.4–11.3)
WBC #/AREA URNS AUTO: >50 /HPF

## 2024-07-09 PROCEDURE — 2500000004 HC RX 250 GENERAL PHARMACY W/ HCPCS (ALT 636 FOR OP/ED): Performed by: EMERGENCY MEDICINE

## 2024-07-09 PROCEDURE — 83735 ASSAY OF MAGNESIUM: CPT | Performed by: EMERGENCY MEDICINE

## 2024-07-09 PROCEDURE — 71045 X-RAY EXAM CHEST 1 VIEW: CPT

## 2024-07-09 PROCEDURE — 36415 COLL VENOUS BLD VENIPUNCTURE: CPT | Performed by: EMERGENCY MEDICINE

## 2024-07-09 PROCEDURE — 81001 URINALYSIS AUTO W/SCOPE: CPT | Performed by: EMERGENCY MEDICINE

## 2024-07-09 PROCEDURE — 96365 THER/PROPH/DIAG IV INF INIT: CPT | Mod: 59

## 2024-07-09 PROCEDURE — 99284 EMERGENCY DEPT VISIT MOD MDM: CPT | Mod: 25

## 2024-07-09 PROCEDURE — 71045 X-RAY EXAM CHEST 1 VIEW: CPT | Mod: FOREIGN READ | Performed by: RADIOLOGY

## 2024-07-09 PROCEDURE — 96361 HYDRATE IV INFUSION ADD-ON: CPT | Mod: 59

## 2024-07-09 PROCEDURE — 76857 US EXAM PELVIC LIMITED: CPT | Performed by: EMERGENCY MEDICINE

## 2024-07-09 PROCEDURE — 96360 HYDRATION IV INFUSION INIT: CPT | Mod: 59

## 2024-07-09 PROCEDURE — 51705 CHANGE OF BLADDER TUBE: CPT

## 2024-07-09 PROCEDURE — 80053 COMPREHEN METABOLIC PANEL: CPT | Performed by: EMERGENCY MEDICINE

## 2024-07-09 PROCEDURE — 85025 COMPLETE CBC W/AUTO DIFF WBC: CPT | Performed by: EMERGENCY MEDICINE

## 2024-07-09 RX ORDER — CEFTRIAXONE 1 G/50ML
1 INJECTION, SOLUTION INTRAVENOUS ONCE
Status: COMPLETED | OUTPATIENT
Start: 2024-07-09 | End: 2024-07-09

## 2024-07-09 RX ORDER — CEFUROXIME AXETIL 500 MG/1
500 TABLET ORAL 2 TIMES DAILY
Qty: 14 TABLET | Refills: 0 | Status: SHIPPED | OUTPATIENT
Start: 2024-07-09 | End: 2024-07-16

## 2024-07-09 ASSESSMENT — PAIN SCALES - GENERAL: PAINLEVEL_OUTOF10: 0 - NO PAIN

## 2024-07-09 ASSESSMENT — PAIN - FUNCTIONAL ASSESSMENT: PAIN_FUNCTIONAL_ASSESSMENT: 0-10

## 2024-07-09 NOTE — ED PROVIDER NOTES
CC: No chief complaint on file.     HPI: Wai Rodrigues III is a 68-year-old male with history including quadriplegia s/p tracheostomy and chronic suprapubic catheter placement, autonomic dysreflexia, hypertension, hyperlipidemia presenting to the emergency department for elevated blood pressure readings that are often associated with suprapubic catheter issues. States he has this changed monthly and this was most recently done a few days ago.  Denies any obvious issue with placement but he is not felt his usual self since. Today his BP was 200s/100s and he didn't feel like the catheter was consistently draining the full amount so he came to the ED.  Has had increased sputum production recently. Denies fever, chills, shortness of breath, vomiting, headache, vision changes, any other new symptoms. No recent trauma.     Limitations to History: none  Additional History Obtained from: wife    PMHx/PSHx:  Per HPI.   Past Medical History:   Diagnosis Date    Coronary artery disease     Funes catheter present     Heart valve disease     Hyperlipidemia     Hypertension     Spinal cord injury at C1-C4 level with spinal cord lesion (Multi)     comlete paralysis from shoulder down    Tracheostomy dependence (Multi)     Urinary tract infection      Past Surgical History:   Procedure Laterality Date    AORTIC VALVE REPLACEMENT      CERVICAL SPINE SURGERY      pins and screws    CORONARY ARTERY BYPASS GRAFT      x4 vessels    PEG TUBE REMOVAL      PEG W/TRACHEOSTOMY PLACEMENT         Social History:  Social History     Tobacco Use    Smoking status: Never    Smokeless tobacco: Never   Substance Use Topics    Alcohol use: Not Currently     Comment: occassional    Drug use: Never       Medications:   Current Outpatient Medications   Medication Instructions    acetaminophen (Tylenol) 325 mg tablet 2 tablets, oral, Every 4 hours PRN    acetaminophen (TYLENOL) 650 mg, Enteral, Every 6 hours PRN    acetaminophen (TYLENOL) 650 mg,  oral, Every 6 hours PRN    acetylcysteine (Mucomyst) 100 mg/mL (10 %) nebulizer solution 6 mL, inhalation, 3 times daily    amino acids-protein hydrolys (LiquaceL)  gram-kcal/30 mL liquid 30 mL, oral, 3 times daily    ascorbic acid (VITAMIN C) 500 mg, oral, Daily    aspirin 81 mg, oral, Daily RT    atorvastatin (LIPITOR) 80 mg, oral, Daily RT    cefuroxime (CEFTIN) 500 mg, oral, 2 times daily    clonazePAM (KLONOPIN) 1 mg, oral, 2 times daily    FLUoxetine (PROZAC) 20 mg, oral, Daily    melatonin 6 mg, oral, Nightly    mirtazapine (REMERON SOL-TAB) 30 mg, oral, Nightly    multivitamins with iron (Cerovite Jr) chewable tablet 1 tablet, oral, Daily RT    traZODone (DESYREL) 50 mg, oral, Nightly        Allergies:  No Known Allergies      ???????????????????????????????????????????????????????????????  Triage Vitals:  T 37.2 °C (98.9 °F)  HR 79  BP (!) 204/98  RR 20  O2 96 %      Physical Exam   Gen: awake, uncomfortable-appearing, diaphoretic   Eyes: no conjunctival injection or scleral icterus, pupils equal, extraocular movements grossly intact  ENT: nares patent, moist mucous membranes  Neck: supple, trachea midline, no masses, trach in place, mild secretions  Heart: regular rate and rhythm, audible heart sounds  Lungs: clear to auscultation bilaterally, no increased work of breathing  Abdomen: soft, suprapubic fullness, otherwise abdomen not distended, no guarding or rebound  : suprapubic catheter in place, no purulence/erythema/drainage at the site, draining minimal clear urine   Extremities: well-perfused, no edema  Neuro: alert, conversant, no facial asymmetry, speech fluent, quadriplegic     ???????????????????????????????????????????????????????????????  ED Course/Medical Decision Makin-year-old male with history including quadriplegia s/p tracheostomy and chronic suprapubic catheter placement, autonomic dysreflexia, hypertension, hyperlipidemia presenting to the emergency department for  elevated blood pressure readings that are often associated with suprapubic catheter issues. Feels like his catheter is not fully draining despite being replaced a few days ago.  He is diaphoretic and uncomfortable appearing but nontoxic and mentating appropriately. Has suprapubic fullness with catheter in place, no signs of infection at the site. POCUS formed interpreted by me reveals the balloon within the bladder but there is notable urinary retention. RN attempted to flush catheter to help encourage drainage but unsuccessful. Elected to replace the santiago to hopefully help provide more relief. His BP is highly labile, ranging from 250 systolic to 120 systolic within minutes.  Given this wide fluctuation, I held off on administering any antihypertensives given concern for causing overtreated hypotension. Catheter was replaced without issue and placement and resolution of urinary retention confirmed on ultrasound. Basic labs and chest x-ray were obtained given overall presenting signs/symptoms. I reviewed CXR which is unremarkable. Labs also unremarkable though urine shows possible developing infection.  Discussed these results with the patient and his wife.  While he may have colonization, we opted to treat with antibiotics given this change from his baseline. He was given ceftriaxone here and will send a prescription for antibiotics to his pharmacy. Will monitor for a little while longer to ensure his BP stabilizes now that the catheter issue has resolved. He was signed out to the oncoming ED attending pending reevaluation of vitals. If stable, I anticipate discharge. I preemptively discussed close monitoring, follow up with urologist and primary doctor.     Diagnoses as of 07/09/24 1656   Autonomic dysreflexia   Suprapubic catheter dysfunction, initial encounter (CMS-HCC)   Urinary tract infection associated with indwelling urethral catheter, initial encounter (CMS-HCC)     Diagnostic imaging independently  reviewed/interpreted by me, as reflected in MDM    External records reviewed: recent inpatient, clinic, and prior ED notes  Discussion of management with other providers: n/a  Escalation of Care: admission considered, outpatient management likely appropriate should vitals stabilize    Disposition: handoff, anticipate discharge  ED Prescriptions       Medication Sig Dispense Start Date End Date Auth. Provider    cefuroxime (Ceftin) 500 mg tablet Take 1 tablet (500 mg) by mouth 2 times a day for 7 days. 14 tablet 7/9/2024 7/16/2024 Perlita Raya MD            Procedures ? [employer] last updated 7/9/2024 4:56 PM        Perlita Raya MD  07/11/24 0140

## 2024-07-09 NOTE — ED PROCEDURE NOTE
Procedure    Performed by: Perlita Raya MD  Authorized by: Perlita Raya MD        Genitourinary Indications: retention of urine          Procedure: Bladder Ultrasound    Findings:  Bladder Visualization: The bladder was visualized and was DISTENDED.     Impression:  Bladder: The bladder was DISTENDED.    Comments: Initial images confirm suprapubic catheter balloon within the bladder with evidence of urinary retention. No significant masses or large clot noted.     Repeat scan after suprapubic catheter was placed shows resolution of urinary retention and adequate placement.                Perlita Raya MD  07/09/24 4196

## 2024-07-09 NOTE — DISCHARGE INSTRUCTIONS
You were seen in the emergency department for elevated blood pressure readings concerning for suprapubic catheter issues based on your history.  Your blood pressure was very labile in the ED. We replaced your catheter after flushing was unsuccessful and we confirmed via ultrasound that it was in the correct place and fully emptying your bladder. Your urinalysis shows possible developing UTI and we discussed starting antibiotics given your symptoms/blood pressure despite the catheter just having been placed a few days ago. Monitor symptoms closely. Check your blood pressure at home regularly. Follow up with your primary doctor.

## 2024-07-09 NOTE — ED TRIAGE NOTES
Pt came in via ems with c/o high bp reading of systolic being in the 200's and diastolic in the 100's. Pmh of autonomic dysreflexia and states BP gets elevated when issue with suprapubic catheter arises. Pt is diaphoretic. Pt denies chest pain, SOB. Pt is a&o4.

## 2024-08-12 NOTE — PROGRESS NOTES
Emergency Medicine Transition of Care Note.    I received Wai Rodrigues III in signout from Dr. Raya.  Please see the previous ED provider note for all HPI, PE and MDM up to the time of signout at 4 PM. This is in addition to the primary record.    In brief Wai Rodrigues III is an 68 y.o. male presenting for No chief complaint on file.    At the time of signout we were awaiting: Blood pressure response    Diagnoses as of 08/12/24 1301   Autonomic dysreflexia   Suprapubic catheter dysfunction, initial encounter (CMS-HCC)   Urinary tract infection associated with indwelling urethral catheter, initial encounter (CMS-HCC)       Medical Decision Making  Patient blood pressure did respond to IV fluids but remains normotensive family reassured family at bedside will take him home advised outpatient follow-up with strict and precaution.    Final diagnoses:   [G90.4] Autonomic dysreflexia   [T83.010A] Suprapubic catheter dysfunction, initial encounter (CMS-HCC)   [T83.511A, N39.0] Urinary tract infection associated with indwelling urethral catheter, initial encounter (CMS-HCC)           Procedure  Procedures    Annia Almonte MD

## 2024-08-13 ENCOUNTER — HOSPITAL ENCOUNTER (EMERGENCY)
Facility: HOSPITAL | Age: 69
Discharge: HOME | End: 2024-08-14
Attending: EMERGENCY MEDICINE
Payer: MEDICARE

## 2024-08-13 DIAGNOSIS — Z93.59 SUPRAPUBIC CATHETER (MULTI): Primary | ICD-10-CM

## 2024-08-13 PROCEDURE — 51702 INSERT TEMP BLADDER CATH: CPT

## 2024-08-13 PROCEDURE — 99283 EMERGENCY DEPT VISIT LOW MDM: CPT | Mod: 25

## 2024-08-13 ASSESSMENT — PAIN - FUNCTIONAL ASSESSMENT: PAIN_FUNCTIONAL_ASSESSMENT: 0-10

## 2024-08-13 ASSESSMENT — PAIN SCALES - GENERAL: PAINLEVEL_OUTOF10: 10 - WORST POSSIBLE PAIN

## 2024-08-14 VITALS
OXYGEN SATURATION: 95 % | RESPIRATION RATE: 16 BRPM | TEMPERATURE: 99.3 F | BODY MASS INDEX: 28.04 KG/M2 | HEIGHT: 68 IN | HEART RATE: 82 BPM | WEIGHT: 185 LBS | DIASTOLIC BLOOD PRESSURE: 66 MMHG | SYSTOLIC BLOOD PRESSURE: 99 MMHG

## 2024-08-14 NOTE — ED PROCEDURE NOTE
Procedure  Suprapubic Cystostomy    Performed by: Deejay Tamayo MD  Authorized by: Deejay Tamayo MD    Consent:     Consent obtained:  Emergent situation    Consent given by:  Patient and spouse    Risks, benefits, and alternatives were discussed: yes      Risks discussed:  Bleeding, bowel perforation, infection and pain    Alternatives discussed:  No treatment, delayed treatment and alternative treatment  Universal protocol:     Patient identity confirmed:  Verbally with patient and hospital-assigned identification number  Sedation:     Sedation type:  None  Anesthesia:     Anesthesia method:  None  Procedure details:     Complexity:  Simple    Catheter type:  Funes    Catheter size:  16 Fr    Ultrasound guidance: no      Number of attempts:  2    Urine characteristics:  Clear  Post-procedure details:     Procedure completion:  Tolerated               Deejay Tamayo MD  08/13/24 3255

## 2024-08-14 NOTE — ED TRIAGE NOTES
From home, suprapubic catheter came out. Changed about 3 days ago. Hx C3 injury. Home nurse found catheter dislodged roughly 30-45 minutes ago.

## 2024-08-14 NOTE — ED PROVIDER NOTES
HPI   Chief Complaint   Patient presents with   • Suprapubic Cath dislodged       68-year-old man with past medical history of quadriplegia with suprapubic catheter does present status post catheter dislodgment.  Wife did try to replace but unable to do so.  No fever or chills.  No nausea or vomiting.  Otherwise without complaints.            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  Social History     Tobacco Use   • Smoking status: Never   • Smokeless tobacco: Never   Vaping Use   • Vaping status: Not on file   Substance Use Topics   • Alcohol use: Not Currently     Comment: occassional   • Drug use: Never       Physical Exam   ED Triage Vitals [08/13/24 2206]   Temperature Heart Rate Respirations BP   37.4 °C (99.3 °F) 63 16 (!) 257/97      Pulse Ox Temp Source Heart Rate Source Patient Position   98 % Axillary Monitor Lying      BP Location FiO2 (%)     Right arm --       Physical Exam  Vitals and nursing note reviewed.   Constitutional:       Appearance: Normal appearance. He is normal weight.      Comments: quadriplegia   HENT:      Head: Normocephalic and atraumatic.      Right Ear: Tympanic membrane normal.      Left Ear: Tympanic membrane normal.      Nose: Nose normal.      Mouth/Throat:      Mouth: Mucous membranes are moist.      Pharynx: Oropharynx is clear.   Eyes:      Extraocular Movements: Extraocular movements intact.       Conjunctiva/sclera: Conjunctivae normal.      Pupils: Pupils are equal, round, and reactive to light.   Cardiovascular:      Rate and Rhythm: Normal rate and regular rhythm.      Pulses: Normal pulses.      Heart sounds: Normal heart sounds.   Pulmonary:      Effort: Pulmonary effort is normal.      Breath sounds: Normal breath sounds.   Abdominal:      General: Abdomen is flat. Bowel sounds are normal. There is no distension.      Tenderness: There is no abdominal tenderness. There is no right CVA tenderness, left CVA tenderness, guarding or rebound.      Comments: Suprapubic catheter site was cleaned and replaced   Musculoskeletal:      Cervical back: Normal range of motion and neck supple.      Comments: Quadriplegia   Skin:     General: Skin is warm and dry.      Capillary Refill: Capillary refill takes less than 2 seconds.   Neurological:      Mental Status: He is alert.      Comments: Quadriplegia no new changes   Psychiatric:         Mood and Affect: Mood normal.         Behavior: Behavior normal.         Thought Content: Thought content normal.         Judgment: Judgment normal.           ED Course & MDM   Diagnoses as of 08/13/24 7837   Suprapubic catheter (Multi)                 No data recorded                                 Medical Decision Making  Three-way catheter was replaced.  Suprapubic catheter was replaced with a 16 Kazakh tube.  Unable to place 18.  Urine does drain properly.  Balloon was inflated.  Patient safely discharged home plan to follow-up with PMD.  Return precautions discussed        Procedure  Procedures     Deejay Tamayo MD  08/13/24 5972

## 2024-08-18 ENCOUNTER — HOSPITAL ENCOUNTER (EMERGENCY)
Facility: HOSPITAL | Age: 69
Discharge: HOME | End: 2024-08-18
Attending: EMERGENCY MEDICINE
Payer: MEDICARE

## 2024-08-18 VITALS
SYSTOLIC BLOOD PRESSURE: 126 MMHG | TEMPERATURE: 98.2 F | RESPIRATION RATE: 18 BRPM | HEART RATE: 70 BPM | WEIGHT: 180 LBS | BODY MASS INDEX: 27.28 KG/M2 | DIASTOLIC BLOOD PRESSURE: 83 MMHG | HEIGHT: 68 IN | OXYGEN SATURATION: 98 %

## 2024-08-18 DIAGNOSIS — T83.010D SUPRAPUBIC CATHETER DYSFUNCTION, SUBSEQUENT ENCOUNTER: Primary | ICD-10-CM

## 2024-08-18 PROCEDURE — 51705 CHANGE OF BLADDER TUBE: CPT

## 2024-08-18 PROCEDURE — 99283 EMERGENCY DEPT VISIT LOW MDM: CPT | Mod: 25

## 2024-08-18 ASSESSMENT — COLUMBIA-SUICIDE SEVERITY RATING SCALE - C-SSRS
1. IN THE PAST MONTH, HAVE YOU WISHED YOU WERE DEAD OR WISHED YOU COULD GO TO SLEEP AND NOT WAKE UP?: NO
2. HAVE YOU ACTUALLY HAD ANY THOUGHTS OF KILLING YOURSELF?: NO
6. HAVE YOU EVER DONE ANYTHING, STARTED TO DO ANYTHING, OR PREPARED TO DO ANYTHING TO END YOUR LIFE?: NO

## 2024-08-18 ASSESSMENT — PAIN - FUNCTIONAL ASSESSMENT: PAIN_FUNCTIONAL_ASSESSMENT: UNABLE TO SELF-REPORT

## 2024-08-18 NOTE — DISCHARGE INSTRUCTIONS
Please schedule follow-up appointment with your urologist and your primary care physician.  Keep catheter in place.  Return immediately if concerning symptoms, as discussed.

## 2024-08-18 NOTE — ED PROVIDER NOTES
HPI   Chief Complaint   Patient presents with    urinary catheter malfunction       HPI  Patient is a 68-year-old male with a past medical history significant for hypertension, CAD, quadriplegia with tracheostomy, suprapubic cath in place who presents emergency room with a chief complaint of suprapubic catheter issue.  He states that he was here last week after the balloon popped and had it switched out.  He went home with a 14 Dominican after they were unable to get a 16 into him.  He normally uses an 18.  Today he felt like it popped again and it stopped draining.  He denies any traumatic injury or other issues other than feeling a significant amount of discomfort and having some high blood pressure.    PMHx: As above  PSHx: As above  FamilyHx: Denies pertinent  SocialHx: Denies  Allergies: NKDA  Medications: See Medication Reconciliation     ROS  As above otherwise      Physical Exam    GENERAL: Awake and Alert, No Acute Distress  HEENT: Well-appearing trach in place AT/NC, PERRL, EOMI, Normal Oropharynx, No Signs of Dehydration  NECK: Normal Inspection, No JVD  CARDIOVASCULAR: RRR, No M/R/G  RESPIRATORY: CTA Bilaterally, No Wheezes, Rales or Rhonchi, Chest Wall Non-tender  ABDOMEN: Suprapubic catheter in place soft, non-tender abdomen, Normal Bowel Sounds, No Distention  BACK: No CVA Tenderness  SKIN: Normal Color, Warm, Dry, No Rashes   EXTREMITIES: Non-Tender, Full ROM, No Pedal Edema  NEURO: A&O x 3, Normal Motor and Sensation, Normal Mood and Affect    Nursing Assessment and Vitals Reviewed    Medical Decision  Patient is a 68-year-old male with a past medical history significant for hypertension, CAD, quadriplegia with tracheostomy, suprapubic cath in place who presents emergency room with a chief complaint of suprapubic catheter issue.  He states that he was here last week after the balloon popped and had it switched out.  He went home with a 14 Dominican after they were unable to get a 16 into him.  He normally  uses an 18.  Today he felt like it popped again and it stopped draining.  He denies any traumatic injury or other issues other than feeling a significant amount of discomfort and having some high blood pressure.    On evaluation patient is uncomfortable but in no acute distress.  He is significantly hypertensive.  Suprapubic catheter is without signs of infection including purulence, surrounding erythema.  We did switch to suprapubic catheter at bedside after thoroughly disinfecting the area with chlorhexidine.  We were unable to pass a 16 but 14 once again went in without difficulty and was draining light yellow urine.  His blood pressure immediately improved and patient reported resolution of symptoms immediately upon decompression of bladder.  States that everything started this morning shortly prior to arrival.  Will hold off on any further imaging or labs as patient is feeling at his baseline.  He is instructed to follow-up with urologist.  He is educated on supportive care at home as well as signs and symptoms that should prompt immediate turn to emergency room.                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  Social History     Tobacco Use    Smoking status: Never    Smokeless tobacco: Never   Vaping Use    Vaping status: Not on file   Substance Use Topics    Alcohol use: Not Currently     Comment: occassional    Drug use: Never        Physical Exam   ED Triage Vitals [08/18/24 1236]   Temperature Heart Rate Respirations BP   36.8 °C (98.2 °F) 100 18 (!) 212/119      Pulse Ox Temp Source Heart Rate Source Patient Position   100 % Temporal Monitor Sitting      BP Location FiO2 (%)     Left arm --       Physical Exam      ED Course & MDM   Diagnoses as of 08/18/24 1436   Suprapubic catheter dysfunction, subsequent encounter                 No data recorded     [place] Coma Scale Score: 15 (08/18/24 1239 : Jena Soliz, EMT)                           Medical Decision Making      Procedure  Procedures     Shazia Meadows MD  08/18/24 1436

## 2024-08-18 NOTE — ED TRIAGE NOTES
Family states that the pt's supra pubic catheter balloon may have popped. Family states that the catheter is not draining any urine and the pt has autonomic dysregulation syndrome and th ept's bp has increased since this morning

## 2024-08-22 ENCOUNTER — APPOINTMENT (OUTPATIENT)
Dept: OTOLARYNGOLOGY | Facility: CLINIC | Age: 69
End: 2024-08-22
Payer: MEDICARE

## 2024-08-22 VITALS — WEIGHT: 183 LBS | HEIGHT: 68 IN | BODY MASS INDEX: 27.74 KG/M2

## 2024-08-22 DIAGNOSIS — R06.00 DYSPNEA, UNSPECIFIED TYPE: ICD-10-CM

## 2024-08-22 DIAGNOSIS — Z43.0 ATTENTION TO TRACHEOSTOMY (MULTI): Primary | ICD-10-CM

## 2024-08-22 PROCEDURE — 1159F MED LIST DOCD IN RCRD: CPT | Performed by: OTOLARYNGOLOGY

## 2024-08-22 PROCEDURE — 3008F BODY MASS INDEX DOCD: CPT | Performed by: OTOLARYNGOLOGY

## 2024-08-22 PROCEDURE — 1036F TOBACCO NON-USER: CPT | Performed by: OTOLARYNGOLOGY

## 2024-08-22 PROCEDURE — 99213 OFFICE O/P EST LOW 20 MIN: CPT | Performed by: OTOLARYNGOLOGY

## 2024-08-22 ASSESSMENT — PATIENT HEALTH QUESTIONNAIRE - PHQ9
SUM OF ALL RESPONSES TO PHQ9 QUESTIONS 1 AND 2: 0
1. LITTLE INTEREST OR PLEASURE IN DOING THINGS: NOT AT ALL
2. FEELING DOWN, DEPRESSED OR HOPELESS: NOT AT ALL

## 2024-08-22 NOTE — PROGRESS NOTES
ASSESSMENT AND PLAN:   Wai Rodrigues III is a 68 y.o. male with a history of tracheostomy with improved breathing at our last visit. He has been capping for several weeks and doing quite well. No issues with current voice reported. He  has occasionally need for suctioning due to trach preventing cough and increasing mucus burden. He may benefit from flatter valve to improve cough productivity. 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 If it hasn't closed, we will consider surgical closure.        Reason For Consult  No chief complaint on file.         HISTORY OF PRESENT ILLNESS:  Wai Rodrigues III is a 68 y.o. male presenting for a follow up visit with me for tracheostomy.  The patient reports he is capping daily, but not at night. He has improved cough, per his wife. He is requiring suctioning intermittently.       Prior History:   Seen last on 06/20/2024  Post op 5/10/24: Granulation tissue tracking vertically both on left and right sides of stoma excised with cups forceps with balloon dilation to 6 lester on first and second pass.  Notable improvement in airway diameter after removing with scalpel/blunt dissection. Normal appearing trachea down to antwon and main stem but with a wider vestibule anterior to tracheal stoma.  Areas where granulation tissue was excised were injected with steroids.    Possible tracheoplasty with scalpel over the balloon. Vs laser.    Quadraplegia after a fall, suprapubic catheter. Goes to [facility]. Dissatisfied with breathing treatments.      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 Surgical History  He has a past surgical history that includes Coronary artery bypass graft; Aortic valve replacement; PEG w/tracheostomy  placement; PEG tube removal; and Cervical spine surgery.   Social History  He reports that he has never smoked. He has never used smokeless tobacco. He reports that he does not currently use alcohol. He reports that he does not use drugs. Allergies  Patient has no known allergies.     Family History  No family history on file.    Review of Systems  All 10 systems were reviewed and negative except for above.      Last Recorded Vitals  There were no vitals taken for this visit.    Physical Exam  ENT Physical Exam  Constitutional  Appearance: patient appears well-developed and well-nourished,  Head and Face  Appearance: head appears normal and face appears normal;  Ear  Auricles: right auricle normal; left auricle normal;  Nose  External Nose: nares patent bilaterally;  Oral Cavity/Oropharynx  Lips: normal;  Neck  Neck: neck palpation normal; tracheostomy noted;  Respiratory  Inspection: no retractions visible;  Cardiovascular  Inspection: no peripheral edema present;  Neurovestibular  Mental Status: alert and oriented;  Psychiatric: mood normal;  Cranial Nerves: cranial nerves intact;         Time Spent  Prep time on day of patient encounter: 10 minutes  Time spent directly with patient, family or caregiver: 15 minutes  Additional Time Spent on Patient Care Activities/Discussion with SLP re care plan: 5 minutes  Documentation Time: 10 minutes  Other Time Spent: 0 minutes  Total: 40 minutes       Scribe Attestation  By signing my name below, Edna WHITE , [place] attest that this documentation has been prepared under the direction and in the presence of Rikki Hardin MD.

## 2024-09-05 ENCOUNTER — HOSPITAL ENCOUNTER (OUTPATIENT)
Facility: HOSPITAL | Age: 69
Setting detail: OUTPATIENT SURGERY
End: 2024-09-05
Attending: OTOLARYNGOLOGY | Admitting: OTOLARYNGOLOGY
Payer: MEDICARE

## 2024-09-05 ENCOUNTER — APPOINTMENT (OUTPATIENT)
Dept: OTOLARYNGOLOGY | Facility: CLINIC | Age: 69
End: 2024-09-05
Payer: MEDICARE

## 2024-09-05 ENCOUNTER — HOSPITAL ENCOUNTER (OUTPATIENT)
Dept: RADIOLOGY | Facility: CLINIC | Age: 69
Discharge: HOME | End: 2024-09-05
Payer: MEDICARE

## 2024-09-05 DIAGNOSIS — Z01.818 PRE-OPERATIVE CLEARANCE: ICD-10-CM

## 2024-09-05 DIAGNOSIS — J95.04 TRACHEOCUTANEOUS FISTULA FOLLOWING TRACHEOSTOMY (MULTI): ICD-10-CM

## 2024-09-05 PROCEDURE — 1159F MED LIST DOCD IN RCRD: CPT | Performed by: OTOLARYNGOLOGY

## 2024-09-05 PROCEDURE — 1036F TOBACCO NON-USER: CPT | Performed by: OTOLARYNGOLOGY

## 2024-09-05 PROCEDURE — 71045 X-RAY EXAM CHEST 1 VIEW: CPT

## 2024-09-05 PROCEDURE — 99213 OFFICE O/P EST LOW 20 MIN: CPT | Performed by: OTOLARYNGOLOGY

## 2024-09-05 PROCEDURE — 71046 X-RAY EXAM CHEST 2 VIEWS: CPT

## 2024-09-05 ASSESSMENT — PATIENT HEALTH QUESTIONNAIRE - PHQ9
2. FEELING DOWN, DEPRESSED OR HOPELESS: NOT AT ALL
SUM OF ALL RESPONSES TO PHQ9 QUESTIONS 1 AND 2: 0
1. LITTLE INTEREST OR PLEASURE IN DOING THINGS: NOT AT ALL

## 2024-09-05 NOTE — PROGRESS NOTES
ASSESSMENT AND PLAN:   Wai Rodrigues III is a 68 y.o. male with a history of tracheostomy with improved breathing.  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 He appears to be breathing well with occludion of the stoma.            Reason For Consult  Chief Complaint   Patient presents with    Follow-up            HISTORY OF PRESENT ILLNESS:  Wai Rodrigues III is a 68 y.o. male presenting for a follow up visit with me for tracheocutaneous fistula.      The patient reports that he is breathing well.           Prior History:   Seen last on 08/22/2024      Tracheostomy management: Consider closure of TC Fistula. Last OR procedure 5/10/24    Post op 5/10/24: Granulation tissue tracking vertically both on left and right sides of stoma excised with cups forceps with balloon dilation to 6 lester on first and second pass.  Notable improvement in airway diameter after removing with scalpel/blunt dissection. Normal appearing trachea down to antwon and main stem but with a wider vestibule anterior to tracheal stoma.  Areas where granulation tissue was excised were injected with steroids.    Possible tracheoplasty with scalpel over the balloon. Vs laser.    Quadraplegia after a fall, suprapubic catheter. Goes to [facility]. Dissatisfied with breathing treatments. Tracheostomy management: Consider closure of TC Fistula. Last OR procedure 5/10/24    Post op 5/10/24: Granulation tissue tracking vertically both on left and right sides of stoma excised with cups forceps with balloon dilation to 6 lester on first and second pass.  Notable improvement in airway diameter after removing with scalpel/blunt dissection. Normal appearing trachea down to antwon and main stem but with a wider vestibule anterior to tracheal stoma.  Areas where granulation tissue was excised were injected with steroids.    Possible tracheoplasty with scalpel over the balloon.  Vs laser.    Quadraplegia after a fall, suprapubic catheter. Goes to [facility]. Dissatisfied with breathing treatments.      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 Surgical History  He has a past surgical history that includes Coronary artery bypass graft; Aortic valve replacement; PEG w/tracheostomy placement; PEG tube removal; and Cervical spine surgery.   Social History  He reports that he has never smoked. He has never used smokeless tobacco. He reports that he does not currently use alcohol. He reports that he does not use drugs. Allergies  Patient has no known allergies.     Family History  No family history on file.    Review of Systems  All 10 systems were reviewed and negative except for above.      Last Recorded Vitals  There were no vitals taken for this visit.    Physical Exam  ENT Physical Exam  Constitutional  Appearance: patient appears well-developed and well-nourished,  Head and Face  Appearance: head appears normal and face appears normal;  Ear  Auricles: right auricle normal; left auricle normal;  Nose  External Nose: nares patent bilaterally;  Oral Cavity/Oropharynx  Lips: normal;  Neck  Neck: neck normal; neck palpation normal;  Respiratory  Inspection: no retractions visible;  Cardiovascular  Inspection: no peripheral edema present;  Neurovestibular  Mental Status: alert and oriented;  Psychiatric: mood normal;  Cranial Nerves: cranial nerves intact;          Time Spent  Prep time on day of patient encounter: 10 minutes  Time spent directly with patient, family or caregiver: 15 minutes  Additional Time Spent on Patient Care Activities/Discussion with SLP re care plan: 5 minutes  Documentation Time: 10 minutes  Other Time Spent: 0 minutes  Total: 40 minutes        Scribe Attestation  By signing my name below, I, Russell Mckenzie attest that this documentation has been prepared under the direction and in the presence of Rikki Hardin MD.

## 2024-09-09 ENCOUNTER — PATIENT MESSAGE (OUTPATIENT)
Dept: UROLOGY | Facility: HOSPITAL | Age: 69
End: 2024-09-09

## 2024-09-12 ENCOUNTER — OFFICE VISIT (OUTPATIENT)
Dept: UROLOGY | Facility: HOSPITAL | Age: 69
End: 2024-09-12
Payer: MEDICARE

## 2024-09-12 DIAGNOSIS — T83.010D SUPRAPUBIC CATHETER DYSFUNCTION, SUBSEQUENT ENCOUNTER: Primary | ICD-10-CM

## 2024-09-12 PROCEDURE — 99214 OFFICE O/P EST MOD 30 MIN: CPT | Performed by: STUDENT IN AN ORGANIZED HEALTH CARE EDUCATION/TRAINING PROGRAM

## 2024-09-12 PROCEDURE — 99204 OFFICE O/P NEW MOD 45 MIN: CPT | Performed by: STUDENT IN AN ORGANIZED HEALTH CARE EDUCATION/TRAINING PROGRAM

## 2024-09-12 RX ORDER — CIPROFLOXACIN 500 MG/1
500 TABLET ORAL 2 TIMES DAILY
Qty: 8 TABLET | Refills: 0 | Status: SHIPPED | OUTPATIENT
Start: 2024-09-12 | End: 2024-09-16

## 2024-09-12 NOTE — PROGRESS NOTES
PCP  No Assigned PCP Generic Provider, MD         CHIEF COMPLAINT:  SP tube malfunction          HISTORY OF PRESENT ILLNESS:  This is a  68 y.o. y.o. who presents with history of traumatic quadriplegia due to spinal cord injury, CAD (s/p CABG x3), sacral ulcer, and urinary retention who is admitted for chronic normocytic anemia with positive Cologuard at home, was a direct admit for colon cancer screening with colonoscopy as he will likely need multi-day bowel prep due to his quadriplegia.     He is presenting for SP malfunction, leakage, blockage, [id number] dislodged.   Sp reposition and replacement multiple times by wife and private nurse       Physical Exam:  Gen: NAD. Non-toxic appearing.  HEENT: Normocephalic, atraumatic.   CV: Bradycardic, regular rhythm.   Lungs: CTAB. Currently breathing through trach.  Abdomen: Soft. Non-tender. Non-distended.   Extremities: Palpable radial pulses bilaterally.   Neuro: Somnolent but awakes to voice.          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    Surgical History  He has a past surgical history that includes Coronary artery bypass graft; Aortic valve replacement; PEG w/tracheostomy placement; PEG tube removal; and Cervical spine surgery.     Social History  He reports that he has never smoked. He has never used smokeless tobacco. He reports that he does not currently use alcohol. He reports that he does not use drugs.    Family History  No family history on file.     Allergies  Patient has no known allergies.        A comprehensive 10+ review of systems was negative except for: see hpi                    PHYSICAL EXAMINATION:  BP Readings from Last 3 Encounters:   08/18/24 126/83   08/14/24 99/66   07/09/24 122/90      Wt  "Readings from Last 3 Encounters:   08/22/24 83 kg (183 lb)   08/18/24 81.6 kg (180 lb)   08/13/24 83.9 kg (185 lb)      BMI: Estimated body mass index is 27.83 kg/m² as calculated from the following:    Height as of 8/22/24: 1.727 m (5' 8\").    Weight as of 8/22/24: 83 kg (183 lb).  BSA: Estimated body surface area is 2 meters squared as calculated from the following:    Height as of 8/22/24: 1.727 m (5' 8\").    Weight as of 8/22/24: 83 kg (183 lb).        IMPRESSION AND PLAN:  Wai Rodrigues III is a 68 y.o. who presents with SP tube malfunction   Today, we had a very long and extensive discussion with the patient regarding the pathophysiology, differential diagnosis, risk factor, associated condition, diagnostic work-up and management of neurogenic bladder and urinary retention. . We discussed at length the mechanism of action, risk, benefit, adverse events and side effect of SP tube. I discussed with the patient and his wife the need to flush the SP tube daily    Plan  Cysto  Renal US  Cipro 500mg x4days empiric            All questions and concerns were answered and addressed.  The patient expressed understanding and agrees with the plan.     9/12/2024      "

## 2024-09-23 ENCOUNTER — HOSPITAL ENCOUNTER (OUTPATIENT)
Dept: RADIOLOGY | Facility: HOSPITAL | Age: 69
Discharge: HOME | End: 2024-09-23
Payer: MEDICARE

## 2024-09-23 DIAGNOSIS — T83.010D SUPRAPUBIC CATHETER DYSFUNCTION, SUBSEQUENT ENCOUNTER: ICD-10-CM

## 2024-09-23 PROCEDURE — 76770 US EXAM ABDO BACK WALL COMP: CPT

## 2024-09-23 PROCEDURE — 76770 US EXAM ABDO BACK WALL COMP: CPT | Performed by: STUDENT IN AN ORGANIZED HEALTH CARE EDUCATION/TRAINING PROGRAM

## 2024-10-10 ENCOUNTER — ANESTHESIA EVENT (OUTPATIENT)
Dept: OPERATING ROOM | Facility: HOSPITAL | Age: 69
End: 2024-10-10

## 2024-10-10 ENCOUNTER — OFFICE VISIT (OUTPATIENT)
Dept: OTOLARYNGOLOGY | Facility: CLINIC | Age: 69
End: 2024-10-10
Payer: MEDICARE

## 2024-10-10 VITALS — WEIGHT: 180 LBS | HEIGHT: 68 IN | BODY MASS INDEX: 27.28 KG/M2

## 2024-10-10 DIAGNOSIS — Z43.0 ATTENTION TO TRACHEOSTOMY (MULTI): Primary | ICD-10-CM

## 2024-10-10 DIAGNOSIS — R49.8 OTHER VOICE AND RESONANCE DISORDERS: ICD-10-CM

## 2024-10-10 PROCEDURE — 3008F BODY MASS INDEX DOCD: CPT | Performed by: OTOLARYNGOLOGY

## 2024-10-10 PROCEDURE — 99213 OFFICE O/P EST LOW 20 MIN: CPT | Performed by: OTOLARYNGOLOGY

## 2024-10-10 PROCEDURE — 1159F MED LIST DOCD IN RCRD: CPT | Performed by: OTOLARYNGOLOGY

## 2024-10-10 RX ORDER — ONDANSETRON HYDROCHLORIDE 2 MG/ML
4 INJECTION, SOLUTION INTRAVENOUS ONCE AS NEEDED
OUTPATIENT
Start: 2024-10-10

## 2024-10-10 RX ORDER — MEPERIDINE HYDROCHLORIDE 25 MG/ML
12.5 INJECTION INTRAMUSCULAR; INTRAVENOUS; SUBCUTANEOUS EVERY 10 MIN PRN
OUTPATIENT
Start: 2024-10-10

## 2024-10-10 RX ORDER — OXYCODONE HYDROCHLORIDE 5 MG/1
5 TABLET ORAL EVERY 4 HOURS PRN
OUTPATIENT
Start: 2024-10-10

## 2024-10-10 RX ORDER — SODIUM CHLORIDE, SODIUM LACTATE, POTASSIUM CHLORIDE, CALCIUM CHLORIDE 600; 310; 30; 20 MG/100ML; MG/100ML; MG/100ML; MG/100ML
100 INJECTION, SOLUTION INTRAVENOUS CONTINUOUS
OUTPATIENT
Start: 2024-10-10

## 2024-10-10 RX ORDER — LIDOCAINE HYDROCHLORIDE 10 MG/ML
0.1 INJECTION, SOLUTION EPIDURAL; INFILTRATION; INTRACAUDAL; PERINEURAL ONCE
OUTPATIENT
Start: 2024-10-10 | End: 2024-10-10

## 2024-10-10 ASSESSMENT — PATIENT HEALTH QUESTIONNAIRE - PHQ9
2. FEELING DOWN, DEPRESSED OR HOPELESS: NOT AT ALL
1. LITTLE INTEREST OR PLEASURE IN DOING THINGS: NOT AT ALL
SUM OF ALL RESPONSES TO PHQ9 QUESTIONS 1 AND 2: 0

## 2024-10-10 NOTE — PROGRESS NOTES
ASSESSMENT AND PLAN:   Wai Rodrigues III is a 68 y.o. 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 He has evidence of closure. Silver nitrate was applied to the granulation tissue at the site. There was no opening into the airway.     We discussed mucus management with humidification and percussive therapy as needed.     He will follow up as needed.          Reason For Consult  No chief complaint on file.      HISTORY OF PRESENT ILLNESS:  Wai Rodrigues III is a 68 y.o. male presenting for a follow up visit with me for a history of a tracheocutaneous fistula.  He is scheduled for a procedure tomorrow.     The patient reports that the fistula might be closed.         Prior History:   Seen last on 09/05/2024 for a persistent tracheocutaneous fistula secondary to the patient's inability to occlude with his hand as he has paralysis. He appears to be breathing well with occludion of the stoma.          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 Surgical History  He has a past surgical history that includes Coronary artery bypass graft; Aortic valve replacement; PEG w/tracheostomy placement; PEG tube removal; and Cervical spine surgery.   Social History  He reports that he has never smoked. He has never used smokeless tobacco. He reports that he does not currently use alcohol. He reports that he does not use drugs. Allergies  Patient has no known allergies.     Family History  No family history on file.    Review of Systems  All 10 systems were reviewed and negative except for above.      Last Recorded Vitals  There were no vitals taken for this visit.    Physical Exam  ENT Physical Exam  Constitutional  Appearance: patient appears well-developed and  well-nourished,  Head and Face  Appearance: head appears normal and face appears normal;  Ear  Auricles: right auricle normal; left auricle normal;  Nose  External Nose: nares patent bilaterally;  Oral Cavity/Oropharynx  Lips: normal;  Neck  Neck: neck normal; neck palpation normal;  Respiratory  Inspection: no retractions visible;  Cardiovascular  Inspection: no peripheral edema present;  Neurovestibular  Mental Status: alert and oriented;  Psychiatric: mood normal;  Cranial Nerves: cranial nerves intact;          Time Spent  Prep time on day of patient encounter: 10 minutes  Time spent directly with patient, family or caregiver: 15 minutes  Additional Time Spent on Patient Care Activities/Discussion with SLP re care plan: 5 minutes  Documentation Time: 10 minutes  Other Time Spent: 0 minutes  Total: 40 minutes     Scribe Attestation  By signing my name below, Edna WHITE , [employer] attest that this documentation has been prepared under the direction and in the presence of Rikki Hardin MD.

## 2024-10-11 ENCOUNTER — ANESTHESIA (OUTPATIENT)
Dept: OPERATING ROOM | Facility: HOSPITAL | Age: 69
End: 2024-10-11
Payer: MEDICARE

## 2024-10-15 ENCOUNTER — PROCEDURE VISIT (OUTPATIENT)
Dept: UROLOGY | Facility: HOSPITAL | Age: 69
End: 2024-10-15
Payer: MEDICARE

## 2024-10-15 DIAGNOSIS — T83.010D SUPRAPUBIC CATHETER DYSFUNCTION, SUBSEQUENT ENCOUNTER: ICD-10-CM

## 2024-10-15 PROCEDURE — 52000 CYSTOURETHROSCOPY: CPT | Performed by: STUDENT IN AN ORGANIZED HEALTH CARE EDUCATION/TRAINING PROGRAM

## 2024-10-15 PROCEDURE — 99213 OFFICE O/P EST LOW 20 MIN: CPT | Performed by: STUDENT IN AN ORGANIZED HEALTH CARE EDUCATION/TRAINING PROGRAM

## 2024-10-15 NOTE — PROGRESS NOTES
Subjective   Patient ID: Wai Rodrigues III is a 68 y.o. male    HPI  68 y.o. 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    He is presenting for SP malfunction, leakage, blockage, [id number] dislodged.   Sp reposition and replacement multiple times by wife and private nurse      He presents today for cystoscopic evaluation of his condition    Review of Systems    All systems were reviewed. Anything negative was noted in the HPI.    Objective   Physical Exam    General: Well developed, well nourished, alert and cooperative, appears in no acute distress   Eyes: Non-injected conjunctiva, sclera clear, no proptosis   Cardiac: Extremities are warm and well perfused. No edema, cyanosis or pallor   Lungs: Breathing is easy, non-labored. Speaking in clear and complete sentences. Normal diaphragmatic movement   MSK: Ambulatory with steady gait, unassisted   Neuro: Alert and oriented to person, place, and time   Psych: Demonstrates good judgment and reason, without hallucinations, abnormal affect or abnormal behaviors   Skin: No obvious lesions, no rashes       No CVA tenderness bilaterally   No suprapubic pain or discomfort       Past Medical History:   Diagnosis Date    Coronary artery disease     Funes catheter present     Heart valve disease     Hyperlipidemia     Hypertension     Spinal cord injury at C1-C4 level with spinal cord lesion (Multi)     comlete paralysis from shoulder down    Tracheostomy dependence (Multi)     Urinary tract infection          Past Surgical History:   Procedure Laterality Date    AORTIC VALVE REPLACEMENT      CERVICAL SPINE SURGERY      pins and screws    CORONARY ARTERY BYPASS GRAFT      x4  vessels    PEG TUBE REMOVAL      PEG W/TRACHEOSTOMY PLACEMENT       Procedure:  The patient was prepped using a Betadine solution. Lidocaine jelly was instilled into the urethra. The flexible cystoscope was sterilely inserted into the urethra and formal cystoscopy performed in a systematic fashion. For detailed findings of the procedure, please see Dr. Pedro’s remarks below  Scope A used, Cipro 500 mg p.o. given      Assessment/Plan   Cystoscopic evaluation for Urinary retention  Suprapubic catheter change    68 y.o. male who presents for the above condition, We had a very long and extensive discussion with the patient regarding the pathophysiology, differential diagnosis, risk factor, management, natural history, incidence and diagnostic work-up of the condition.      - Suprapubic catheter complications due to dislodgement, changed today.  - Discussion on catheter management options and importance of routine checking.      Plan:  - Follow up in 1 month for catheter change    E&M visit today is associated with current or anticipated ongoing medical care services related to a patient's single, serious condition or a complex condition.       10/15/2024    Scribe Attestation  By signing my name below, IMadeline Scribe   attest that this documentation has been prepared under the direction and in the presence of Dr. Horace Pedro

## 2024-10-16 NOTE — PROGRESS NOTES
Patient ID: Wai Rodrigues III is a 68 y.o. male.    Cystoscopy    Date/Time: 10/16/2024 1:01 PM    Performed by: Horace Pedro MD MPH  Authorized by: Horace Pedro MD MPH    Procedure - Bladder Cystoscopy:     Procedure details: cystoscopy    PROCEDURE NOTE:    PREOPERATIVE DIAGNOSIS:  SP tube and recurrent UTI    POSTOPERATIVE DIAGNOSIS:  Same    OPERATION:  Flexible Cystourethroscopy      SURGEON:  Horace Pedro MD MPH    ANESTHESIA:  2%  lidocaine jelly    COMPLICATIONS:  None    EBL: Minimal      DISPOSITION:  The patient was discharged home after the procedure, per routine.    INDICATIONS: :  Mr. Rodrigues is a 68 y.o. patient with a history of SP tube and recurrent UTI who presents today for Cystoscopy.     The indications, risks and benefits of this procedure were discussed with the patient, consent was obtained prior to the procedure, and to the best of my judgement the patient seemed to understand and agree to the procedure.    PROCEDURE:  The patient  was brought into the procedure suite and informed consent was reviewed and confirmed. Vital signs were obtained prior to the procedure: There were no vitals taken for this visit..  The patient was escorted onto the stretcher, placed supine, prepped with betadine and draped in the usual standard surgical fashion.  Intraurethral 2% viscous lidocaine jelly was used for local analgesia.  A 16 Fijian flexible cystourethroscope was inserted into the urethra.   The penile urethra was normal but we encountered a stone attached to his bulbar urethra.   The prostate urethra was enlarged.  Upon entering the bladder the entire bladder was surveyed in a 360 degree fashion.  The left and right ureteral orifices were in normal orthotopic position effluxing clear yellow urine, bilaterally.   There was no evidence of any bladder lesions, foreign objects, stones or evidence of any mucosal changes. The cystoscope was then retroflexed.  The bladder neck was then  further examined without any evidence of lesions. The scope was then removed and in an antegrade fashion, the urethra and bladder were again resurveyed with no evidence of additional lesions.  The cystoscope was then fully removed.   The patient tolerated the procedure well.  Vitals were stable after the procedure.  The patient was able to void and was discharged home.  Verbal and written Post procedure instructions were reviewed with the patient.    IMPRESSION:  Urethral stone     PLAN:  SP tube exchange q month

## 2024-11-14 ENCOUNTER — APPOINTMENT (OUTPATIENT)
Dept: OTOLARYNGOLOGY | Facility: CLINIC | Age: 69
End: 2024-11-14
Payer: MEDICARE

## 2024-11-17 NOTE — PROGRESS NOTES
[facility]  Outpatient Clinic Note    Patient Name:  Wai Rodrigues III  MRN:  [id number]  :  1955  Date of Service: 2024     Visit type: Follow up visit    HPI    Interval History:  Wai Rodrigues III is a 68 y.o. male who is being seen today for  problems listed below.     Problem list/Chief complaints:  Neurogenic bladder with urinary retention s/p suprapubic catheter  Urethral stone      10/15/24: Visit with Dr. Pedro. 68 y.o. 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     He is presenting for SP malfunction, leakage, blockage, [id number] dislodged.   Sp reposition and replacement multiple times by wife and private nurse       He presents today for cystoscopic evaluation of his condition    - Suprapubic catheter complications due to dislodgement, changed today.  - Discussion on catheter management options and importance of routine checking. Follow up in 1 month for catheter change     24: Patient presents for follow up and suprapubic catheter exchange. He is accompanied by his wife. Patient's wife states she irrigates patient's catheter twice a day to prevent it from clogging up with sediment. Funes catheter has been draining clear, yellow urine with some sediments. No hematuria noted, no fever or chills. Patient's wife would like to change patient's catheter at home as needed. They have a private care nurse hired who helps at times and brings supplies with her, but they do not have catheter supplies at home.    Past Medical History:   Diagnosis Date    Coronary artery disease     Funes catheter present     Heart valve disease     Hyperlipidemia     Hypertension     Spinal cord  injury at C1-C4 level with spinal cord lesion (Multi)     comlete paralysis from shoulder down    Tracheostomy dependence (Multi)     Urinary tract infection        Past Surgical History:   Procedure Laterality Date    AORTIC VALVE REPLACEMENT      CERVICAL SPINE SURGERY      pins and screws    CORONARY ARTERY BYPASS GRAFT      x4 vessels    PEG TUBE REMOVAL      PEG W/TRACHEOSTOMY PLACEMENT         Social History     Socioeconomic History    Marital status:      Spouse name: Not on file    Number of children: Not on file    Years of education: Not on file    Highest education level: Not on file   Occupational History    Not on file   Tobacco Use    Smoking status: Never    Smokeless tobacco: Never   Vaping Use    Vaping status: Not on file   Substance and Sexual Activity    Alcohol use: Not Currently     Comment: occassional    Drug use: Never    Sexual activity: Defer   Other Topics Concern    Not on file   Social History Narrative    Not on file     Social Drivers of Health     Financial Resource Strain: Low Risk  (10/18/2024)    Received from [facility]    Overall Financial Resource Strain (CARDIA)     Difficulty of Paying Living Expenses: Not hard at all   Food Insecurity: No Food Insecurity (10/18/2024)    Received from [facility]    Hunger Vital Sign     Worried About Running Out of Food in the Last Year: Never true     Ran Out of Food in the Last Year: Never true   Transportation Needs: No Transportation Needs (10/18/2024)    Received from [facility]    PRAPARE - Transportation     Lack of Transportation (Medical): No     Lack of Transportation (Non-Medical): No   Physical Activity: Inactive (10/18/2024)    Received from [facility]    Exercise Vital Sign     Days of Exercise per Week: 0 days     Minutes of Exercise per Session: 0 min   Stress: No Stress Concern Present (10/18/2024)    Received from [facility]    [employer] - Occupational Stress Questionnaire      Feeling of Stress : Only a little   Social Connections: Moderately Integrated (10/18/2024)    Received from [employer]    Social Connection and Isolation Panel [NHANES]     Frequency of Communication with Friends and Family: More than three times a week     Frequency of Social Gatherings with Friends and Family: More than three times a week     Attends [employer] Services: 1 to 4 times per year     Active Member of Clubs or Organizations: No     Attends Club or Organization Meetings: Never     Marital Status:    Intimate Partner Violence: Not At Risk (10/18/2024)    Received from [facility]    Humiliation, Afraid, Rape, and Kick questionnaire     Fear of Current or Ex-Partner: No     Emotionally Abused: No     Physically Abused: No     Sexually Abused: No   Housing Stability: Unknown (10/18/2024)    Received from [employer]    Housing Stability Vital Sign     Unable to Pay for Housing in the Last Year: No     Number of Times Moved in the Last Year: Not on file     Homeless in the Last Year: No       No Known Allergies       Current Outpatient Medications:     acetaminophen (Tylenol) 325 mg tablet, Take 2 tablets (650 mg) by mouth every 4 hours if needed., Disp: , Rfl:     acetaminophen (Tylenol) 325 mg tablet, Take 2 tablets (650 mg) by mouth every 6 hours if needed for moderate pain (4 - 6)., Disp: 30 tablet, Rfl: 0    acetaminophen 650 mg/20.3 mL solution oral liquid, 20.3 mL (650 mg) by Enteral route every 6 hours if needed., Disp: , Rfl:     acetylcysteine (Mucomyst) 100 mg/mL (10 %) nebulizer solution, Inhale 6 mL 3 times a day., Disp: , Rfl:     amino acids-protein hydrolys (LiquaceL)  gram-kcal/30 mL liquid, Take 30 mL by mouth 3 times a day., Disp: , Rfl:     ascorbic acid (Vitamin C) 500 mg ER capsule, Take 1 capsule (500 mg) by mouth once daily., Disp: , Rfl:     aspirin 81 mg chewable tablet, Chew 1 tablet (81 mg) once daily., Disp: , Rfl:     atorvastatin (Lipitor) 80 mg tablet, Take 1  tablet (80 mg) by mouth once daily., Disp: , Rfl:     clonazePAM (KlonoPIN) 1 mg tablet, Take 1 tablet (1 mg) by mouth twice a day., Disp: , Rfl:     FLUoxetine (PROzac) 20 mg capsule, Take 1 capsule (20 mg) by mouth once daily., Disp: , Rfl:     melatonin 3 mg capsule, Take 6 mg by mouth once daily at bedtime., Disp: , Rfl:     mirtazapine (Remeron Sol-Tab) 30 mg disintegrating tablet, Take 1 tablet (30 mg) by mouth once daily at bedtime., Disp: , Rfl:     multivitamins with iron (Cerovite Jr) chewable tablet, Chew 1 tablet once daily., Disp: , Rfl:     traZODone (Desyrel) 50 mg tablet, Take 1 tablet (50 mg) by mouth once daily at bedtime., Disp: , Rfl:      Review of system:  All other systems have been reviewed and are negative for complaints      Last recorded vitals:  There were no vitals taken for this visit.    Physical Exam:  General: Appears comfortable and in no apparent distress.  Head: Normocephalic, atraumatic  Eyes: Non-injected conjunctiva, sclera clear, no proptosis  Lungs: Breathing is easy, non-labored. Speaking in clear and complete sentences. Normal diaphragmatic movement.  Cardiovascular: no peripheral edema, cyanosis or pallor.   Abdomen: soft, non-distended, non-tender; suprapubic site with no s/sx of infection  : Bladder: non tender, not distended  MSK: Using wheelchair  Skin: No visible rashes or lesions  Neurologic: Alert, oriented to person, place, and time  Psychiatric: mood and affect appropriate      Imaging  US renal complete  Narrative: Interpreted By:  Mark Carmichael,   STUDY:  US RENAL COMPLETE;  9/23/2024 2:20 pm      INDICATION:  Signs/Symptoms:neurogenic bladder sp SP tube.      ,T83.010D Breakdown (mechanical) of cystostomy catheter, subsequent  encounter      COMPARISON:  None.      ACCESSION NUMBER(S):  [id number]      ORDERING CLINICIAN:  MAGNUS WEAVER      TECHNIQUE:  Multiple images of the kidneys were obtained  .      FINDINGS:  RIGHT KIDNEY:  The right kidney  measures 10.0 cm in length. The renal cortical  echogenicity and thickness are within normal limits. No  hydronephrosis is present; no evidence of nephrolithiasis.      LEFT KIDNEY:  The left kidney measures 9.8 cm in length. The renal cortical  echogenicity and thickness are within normal limits. No  hydronephrosis is present; no evidence of nephrolithiasis.      BLADDER:  Decompressed      Impression: 1. No nephrolithiasis or hydronephrosis.  2. The urinary bladder is not evaluated given decompression by  unclamped suprapubic catheter .      MACRO:  None          Signed by: Mark Carmichael 9/24/2024 6:09 PM  Dictation workstation:   [id number]    Procedure: Cystoscopy with Dr. Pedro 10/16/24  The patient  was brought into the procedure suite and informed consent was reviewed and confirmed. Vital signs were obtained prior to the procedure: There were no vitals taken for this visit..  The patient was escorted onto the stretcher, placed supine, prepped with betadine and draped in the usual standard surgical fashion.  Intraurethral 2% viscous lidocaine jelly was used for local analgesia.  A 16 Romanian flexible cystourethroscope was inserted into the urethra.   The penile urethra was normal but we encountered a stone attached to his bulbar urethra.   The prostate urethra was enlarged.  Upon entering the bladder the entire bladder was surveyed in a 360 degree fashion.  The left and right ureteral orifices were in normal orthotopic position effluxing clear yellow urine, bilaterally.   There was no evidence of any bladder lesions, foreign objects, stones or evidence of any mucosal changes. The cystoscope was then retroflexed.  The bladder neck was then further examined without any evidence of lesions. The scope was then removed and in an antegrade fashion, the urethra and bladder were again resurveyed with no evidence of additional lesions.  The cystoscope was then fully removed.   The patient tolerated the procedure well.   Vitals were stable after the procedure.  The patient was able to void and was discharged home.  Verbal and written Post procedure instructions were reviewed with the patient.     IMPRESSION:  Urethral stone      PLAN:  SP tube exchange q month     Labs  Admission on 07/09/2024, Discharged on 07/09/2024   Component Date Value    WBC 07/09/2024 7.1     nRBC 07/09/2024 0.0     RBC 07/09/2024 4.80     Hemoglobin 07/09/2024 12.9 (L)     Hematocrit 07/09/2024 41.0     MCV 07/09/2024 85     MCH 07/09/2024 26.9     MCHC 07/09/2024 31.5 (L)     RDW 07/09/2024 18.2 (H)     Platelets 07/09/2024 288     Neutrophils % 07/09/2024 73.6     Immature Granulocytes %,* 07/09/2024 0.4     Lymphocytes % 07/09/2024 16.0     Monocytes % 07/09/2024 7.2     Eosinophils % 07/09/2024 2.2     Basophils % 07/09/2024 0.6     Neutrophils Absolute 07/09/2024 5.25     Immature Granulocytes Ab* 07/09/2024 0.03     Lymphocytes Absolute 07/09/2024 1.14 (L)     Monocytes Absolute 07/09/2024 0.51     Eosinophils Absolute 07/09/2024 0.16     Basophils Absolute 07/09/2024 0.04     Magnesium 07/09/2024 1.90     Glucose 07/09/2024 144 (H)     Sodium 07/09/2024 140     Potassium 07/09/2024 3.9     Chloride 07/09/2024 104     Bicarbonate 07/09/2024 26     Anion Gap 07/09/2024 14     Urea Nitrogen 07/09/2024 20     Creatinine 07/09/2024 0.42 (L)     eGFR 07/09/2024 >90     Calcium 07/09/2024 8.6     Albumin 07/09/2024 3.4     Alkaline Phosphatase 07/09/2024 116     Total Protein 07/09/2024 7.3     AST 07/09/2024 15     Bilirubin, Total 07/09/2024 0.3     ALT 07/09/2024 24     Color, Urine 07/09/2024 Light-Yellow     Appearance, Urine 07/09/2024 Turbid (N)     Specific Gravity, Urine 07/09/2024 1.007     pH, Urine 07/09/2024 7.5     Protein, Urine 07/09/2024 20 (TRACE)     Glucose, Urine 07/09/2024 Normal     Blood, Urine 07/09/2024 0.5 (2+) (A)     Ketones, Urine 07/09/2024 NEGATIVE     Bilirubin, Urine 07/09/2024 NEGATIVE     Urobilinogen, Urine 07/09/2024  Normal     Nitrite, Urine 07/09/2024 NEGATIVE     Leukocyte Esterase, Urine 07/09/2024 500 Claudia/µL (A)     WBC, Urine 07/09/2024 >50 (A)     RBC, Urine 07/09/2024 3-5     Bacteria, Urine 07/09/2024 1+ (A)     Mucus, Urine 07/09/2024 FEW        Assessment and Plan:  Wai Rodrigues III is a 68 y.o. 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     Bladder Catheterization  Current indwelling catheter, 18 fr, was removed without difficulty.  Prep: patient was prepped and draped in usual sterile fashion    Prep type: betadine       Procedure Details    Procedure: catheter change      Catheter insertion: suprapubic    Catheter size: 18 fr straight    Catheter provided by: health care organization      Number of initial attempts: 1    Urine characteristics: clear, yellow. Catheter irrigated with 120 ml of sterile water    Balloon inflation amount (mL) comment: 10 ml    Leg bag attached: No, overnight catheter placed      Post-Procedure Details     Outcome: patient tolerated procedure well with no complications      Post-procedure interventions: post-procedure education provided      Disposition: discharged home in satisfactory condition       Plan:  -Santiago catheter changed with no complications  -Order form sent to [facility] for catheter supplies. Extra supplies requested as catheter becomes clogged and needs to be changed more frequently at times.  -Patient's wife to continue irrigating santiago catheter as needed to prevent clogging  -Follow-up in 4 weeks for catheter exchange, or sooner if needed, to reassess symptoms and for medication refill.  -Patient's wife states she feels comfortable changing patient's catheter at home and will cancel appointment if not needed.    All questions and concerns were addressed. Patient verbalizes understanding and has no other questions at this  time.     Some elements copied from Dr. Pedro's note on 10/15/24, the elements have been updated and all reflect current decision making from today, 11/18/24    E&M visit today is associated with current or anticipated ongoing medical care services related to a patient's single, serious condition or a complex condition.    DANIEL Clark-CNP   [facility]  11/18/24 3:04 PM

## 2024-11-18 ENCOUNTER — OFFICE VISIT (OUTPATIENT)
Dept: UROLOGY | Facility: HOSPITAL | Age: 69
End: 2024-11-18
Payer: MEDICARE

## 2024-11-18 DIAGNOSIS — N31.9 NEUROGENIC BLADDER: ICD-10-CM

## 2024-11-18 DIAGNOSIS — G82.50 QUADRIPLEGIA: ICD-10-CM

## 2024-11-18 DIAGNOSIS — R33.9 URINARY RETENTION: Primary | ICD-10-CM

## 2024-11-18 PROCEDURE — 99417 PROLNG OP E/M EACH 15 MIN: CPT | Performed by: NURSE PRACTITIONER

## 2024-11-18 PROCEDURE — 51702 INSERT TEMP BLADDER CATH: CPT | Performed by: NURSE PRACTITIONER

## 2024-11-18 PROCEDURE — 99214 OFFICE O/P EST MOD 30 MIN: CPT | Performed by: NURSE PRACTITIONER

## 2024-11-18 PROCEDURE — 1159F MED LIST DOCD IN RCRD: CPT | Performed by: NURSE PRACTITIONER

## 2024-11-18 PROCEDURE — 1160F RVW MEDS BY RX/DR IN RCRD: CPT | Performed by: NURSE PRACTITIONER

## 2024-11-18 PROCEDURE — 51705 CHANGE OF BLADDER TUBE: CPT | Performed by: NURSE PRACTITIONER

## 2024-11-18 PROCEDURE — 51700 IRRIGATION OF BLADDER: CPT | Performed by: NURSE PRACTITIONER

## 2024-11-18 PROCEDURE — 1036F TOBACCO NON-USER: CPT | Performed by: NURSE PRACTITIONER

## 2024-11-20 ENCOUNTER — TELEPHONE (OUTPATIENT)
Dept: UROLOGY | Facility: HOSPITAL | Age: 69
End: 2024-11-20
Payer: MEDICARE

## 2024-11-20 NOTE — TELEPHONE ENCOUNTER
Message left for pt regarding appt change to 12/13.  Direct number of [phone] to confirm or reschedule

## 2024-12-13 ENCOUNTER — APPOINTMENT (OUTPATIENT)
Dept: UROLOGY | Facility: HOSPITAL | Age: 69
End: 2024-12-13
Payer: MEDICARE

## 2024-12-16 ENCOUNTER — APPOINTMENT (OUTPATIENT)
Dept: UROLOGY | Facility: HOSPITAL | Age: 69
End: 2024-12-16
Payer: MEDICARE

## 2025-01-22 ENCOUNTER — PATIENT MESSAGE (OUTPATIENT)
Dept: UROLOGY | Facility: HOSPITAL | Age: 70
End: 2025-01-22
Payer: MEDICARE

## 2025-01-23 DIAGNOSIS — T83.010D SUPRAPUBIC CATHETER DYSFUNCTION, SUBSEQUENT ENCOUNTER: Primary | ICD-10-CM

## 2025-01-23 RX ORDER — CIPROFLOXACIN 500 MG/1
500 TABLET ORAL 2 TIMES DAILY
Qty: 10 TABLET | Refills: 0 | Status: SHIPPED | OUTPATIENT
Start: 2025-01-23 | End: 2025-01-28

## 2025-01-27 DIAGNOSIS — R39.9 LOWER URINARY TRACT SYMPTOMS (LUTS): Primary | ICD-10-CM

## 2025-04-22 NOTE — PROGRESS NOTES
Subjective   Patient ID: Wai Rodrigues III is a 69 y.o. male    HPI  69 y.o. 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    Today, he is presenting for a catheter blockage. He changed his catheter once every 3 weeks. He does flush with a liter of water.      Review of Systems    All systems were reviewed. Anything negative was noted in the HPI.    Objective   Physical Exam    General: Well developed, well nourished, alert and cooperative, appears in no acute distress   Eyes: Non-injected conjunctiva, sclera clear, no proptosis   Cardiac: Extremities are warm and well perfused. No edema, cyanosis or pallor   Lungs: Breathing is easy, non-labored. Speaking in clear and complete sentences. Normal diaphragmatic movement   MSK: nonAmbulatory   Neuro: Alert and oriented to person, place, and time   Psych: Demonstrates good judgment and reason, without hallucinations, abnormal affect or abnormal behaviors   Skin: No obvious lesions, no rashes       No CVA tenderness bilaterally   No suprapubic pain or discomfort       Past Medical History:   Diagnosis Date    Coronary artery disease     Funes catheter present     Heart valve disease     Hyperlipidemia     Hypertension     Spinal cord injury at C1-C4 level with spinal cord lesion (Multi)     comlete paralysis from shoulder down    Tracheostomy dependence (Multi)     Urinary tract infection        Past Surgical History:   Procedure Laterality Date    AORTIC VALVE REPLACEMENT      CERVICAL SPINE SURGERY      pins and screws    CORONARY ARTERY BYPASS GRAFT      x4 vessels    PEG TUBE REMOVAL      PEG W/TRACHEOSTOMY PLACEMENT         Assessment/Plan   Urinary retention,  Suprapubic catheter getting clogged     69 y.o. male who presents for the above condition, We had a very long and extensive discussion with the patient regarding the pathophysiology, differential diagnosis, risk factor, management, natural history, incidence and diagnostic work-up of the condition.      - Advised pt to drink at least 60 to 80 ounces of water daily.   - Advised pt to avoid foods high in oxalate (coffee and tea, nuts and chocolate, kale and spinach).    Plan:  - Follow-up virtually in 6 months        E&M visit today is associated with current or anticipated ongoing medical care services related to a patient's single, serious condition or a complex condition.     4/23/2025    Scribe Attestation  By signing my name below, ISwati Scribe attest that this documentation has been prepared under the direction and in the presence of Dr. Horace Pedro.

## 2025-04-23 ENCOUNTER — OFFICE VISIT (OUTPATIENT)
Dept: UROLOGY | Facility: HOSPITAL | Age: 70
End: 2025-04-23
Payer: MEDICARE

## 2025-04-23 DIAGNOSIS — R33.9 RETENTION OF URINE: Primary | ICD-10-CM

## 2025-04-23 PROCEDURE — 99214 OFFICE O/P EST MOD 30 MIN: CPT | Performed by: STUDENT IN AN ORGANIZED HEALTH CARE EDUCATION/TRAINING PROGRAM

## 2025-04-23 PROCEDURE — 1159F MED LIST DOCD IN RCRD: CPT | Performed by: STUDENT IN AN ORGANIZED HEALTH CARE EDUCATION/TRAINING PROGRAM

## 2025-04-23 PROCEDURE — G2211 COMPLEX E/M VISIT ADD ON: HCPCS | Performed by: STUDENT IN AN ORGANIZED HEALTH CARE EDUCATION/TRAINING PROGRAM

## 2025-05-08 ENCOUNTER — OFFICE VISIT (OUTPATIENT)
Dept: UROLOGY | Facility: HOSPITAL | Age: 70
End: 2025-05-08
Payer: MEDICARE

## 2025-05-08 DIAGNOSIS — T83.9XXS PROBLEM WITH FOLEY CATHETER, SEQUELA: Primary | ICD-10-CM

## 2025-05-08 PROCEDURE — G2211 COMPLEX E/M VISIT ADD ON: HCPCS | Performed by: STUDENT IN AN ORGANIZED HEALTH CARE EDUCATION/TRAINING PROGRAM

## 2025-05-08 PROCEDURE — 99214 OFFICE O/P EST MOD 30 MIN: CPT | Performed by: STUDENT IN AN ORGANIZED HEALTH CARE EDUCATION/TRAINING PROGRAM

## 2025-05-08 PROCEDURE — 1159F MED LIST DOCD IN RCRD: CPT | Performed by: STUDENT IN AN ORGANIZED HEALTH CARE EDUCATION/TRAINING PROGRAM

## 2025-05-08 NOTE — PROGRESS NOTES
Subjective   Patient ID: Wai Rodrigues III is a 69 y.o. male    HPI  69 y.o. 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    Today, he reports having bladder spasms causing concern of catheter is still getting clogged. Irrigation is being completed twice a day. Catheter was last changed last night.       Review of Systems    All systems were reviewed. Anything negative was noted in the HPI.    Objective   Physical Exam    General: Well developed, well nourished, alert and cooperative, appears in no acute distress   Eyes: Non-injected conjunctiva, sclera clear, no proptosis   Cardiac: Extremities are warm and well perfused. No edema, cyanosis or pallor   Lungs: Breathing is easy, non-labored. Speaking in clear and complete sentences. Normal diaphragmatic movement   MSK: nonAmbulatory   Neuro: Alert and oriented to person, place, and time   Psych: Demonstrates good judgment and reason, without hallucinations, abnormal affect or abnormal behaviors   Skin: No obvious lesions, no rashes       No CVA tenderness bilaterally   No suprapubic pain or discomfort       Past Medical History:   Diagnosis Date    Coronary artery disease     Funes catheter present     Heart valve disease     Hyperlipidemia     Hypertension     Spinal cord injury at C1-C4 level with spinal cord lesion (Multi)     comlete paralysis from shoulder down    Tracheostomy dependence (Multi)     Urinary tract infection        Past Surgical History:   Procedure Laterality Date    AORTIC VALVE REPLACEMENT      CERVICAL SPINE SURGERY      pins and screws    CORONARY ARTERY BYPASS GRAFT      x4 vessels    PEG TUBE REMOVAL      PEG W/TRACHEOSTOMY PLACEMENT          Assessment/Plan   Urinary retention, Suprapubic catheter getting clogged     69 y.o. male who presents for the above condition, We had a very long and extensive discussion with the patient regarding the pathophysiology, differential diagnosis, risk factor, management, natural history, incidence and diagnostic work-up of the condition.      - Advised pt to drink at least 60 to 80 ounces of water daily.   - Advised pt to avoid foods high in oxalate (coffee and tea, nuts and chocolate, kale and spinach).    Pt explained other options including urinary diversion, nephrostomy bilateral, change of SP tube, urethral cath. We also went at length into the discussion of the risk, benefit, potential complication, adverse events including but not limited to infection, bleeding, pain, edema and swelling, injury to the surrounding structures       Plan:  - fu as needed          E&M visit today is associated with current or anticipated ongoing medical care services related to a patient's single, serious condition or a complex condition.     5/8/2025    Bahmanibedilia Attestation  By signing my name below, I, Russell Dominguez attest that this documentation has been prepared under the direction and in the presence of Dr. Horace Pedro.

## (undated) DEVICE — SYRINGE, 1 CC, LUER LOCK

## (undated) DEVICE — Device

## (undated) DEVICE — DRESSING, NON-ADHERENT, TELFA, OUCHLESS, 3 X 8 IN, STERILE

## (undated) DEVICE — SOLUTION, IRRIGATION, SODIUM CHLORIDE 0.9%, 1000 ML, POUR BOTTLE

## (undated) DEVICE — DEVICE, INFLATION, CRE, 60CC, STERILE

## (undated) DEVICE — TUBING, SUCTION, CONNECTING, STERILE 0.25 X 120 IN., LF

## (undated) DEVICE — MARKER, SKIN, DUAL TIP INK W/9 LABEL AND REMOVABLE TIME OUT SLEEVE

## (undated) DEVICE — DRAPE, INSTRUMENT, W/POUCH, STERI DRAPE, 7 X 11 IN, DISPOSABLE, STERILE

## (undated) DEVICE — SYRINGE, 10 CC, LUER LOCK

## (undated) DEVICE — DENTITION PROTECTOR, QUICKGUARD, NON-PERF

## (undated) DEVICE — PAD, EYE, OVAL, 1 5/8 X 2 5/8 IN, STERILE

## (undated) DEVICE — TUBING, SUCTION, NON-CONDUCTIVE, W/CONNECT,.25 IN X 12 FT, STERILE, LF

## (undated) DEVICE — SYRINGE, 50 CC, LUER LOCK

## (undated) DEVICE — SYRINGE, HYPODERMIC, TB, W/O NEEDLE, 1 CC, SLIP TIP

## (undated) DEVICE — NEEDLE, INJECTION, OROTRACHEAL

## (undated) DEVICE — TOWEL, SURGICAL, NEURO, O/R, 16 X 26, BLUE, STERILE

## (undated) DEVICE — CONTAINER, SPECIMEN, 120 ML, STERILE